# Patient Record
Sex: FEMALE | Race: WHITE | NOT HISPANIC OR LATINO | Employment: OTHER | URBAN - METROPOLITAN AREA
[De-identification: names, ages, dates, MRNs, and addresses within clinical notes are randomized per-mention and may not be internally consistent; named-entity substitution may affect disease eponyms.]

---

## 2017-03-10 ENCOUNTER — OFFICE VISIT (OUTPATIENT)
Dept: FAMILY MEDICINE | Facility: CLINIC | Age: 57
End: 2017-03-10
Payer: COMMERCIAL

## 2017-03-10 VITALS
HEART RATE: 73 BPM | HEIGHT: 62 IN | OXYGEN SATURATION: 100 % | WEIGHT: 130 LBS | SYSTOLIC BLOOD PRESSURE: 116 MMHG | RESPIRATION RATE: 14 BRPM | DIASTOLIC BLOOD PRESSURE: 73 MMHG | BODY MASS INDEX: 23.92 KG/M2 | TEMPERATURE: 98.6 F

## 2017-03-10 DIAGNOSIS — H10.32 ACUTE CONJUNCTIVITIS OF LEFT EYE, UNSPECIFIED ACUTE CONJUNCTIVITIS TYPE: Primary | ICD-10-CM

## 2017-03-10 PROCEDURE — 99213 OFFICE O/P EST LOW 20 MIN: CPT | Performed by: PHYSICIAN ASSISTANT

## 2017-03-10 RX ORDER — MULTIPLE VITAMINS W/ MINERALS TAB 9MG-400MCG
1 TAB ORAL DAILY
COMMUNITY
End: 2017-09-12

## 2017-03-10 RX ORDER — POLYMYXIN B SULFATE AND TRIMETHOPRIM 1; 10000 MG/ML; [USP'U]/ML
1 SOLUTION OPHTHALMIC EVERY 4 HOURS
Qty: 1 BOTTLE | Refills: 0 | Status: SHIPPED | OUTPATIENT
Start: 2017-03-10 | End: 2017-03-17

## 2017-03-10 NOTE — NURSING NOTE
"Chief Complaint   Patient presents with     Eye Problem       Initial /73  Pulse 73  Temp 98.6  F (37  C) (Oral)  Ht 5' 2\" (1.575 m)  Wt 130 lb (59 kg)  LMP 12/10/2010  SpO2 100%  Breastfeeding? No  BMI 23.78 kg/m2 Estimated body mass index is 23.78 kg/(m^2) as calculated from the following:    Height as of this encounter: 5' 2\" (1.575 m).    Weight as of this encounter: 130 lb (59 kg).  Medication Reconciliation: complete     Shalini Umanzor CMA      "

## 2017-03-10 NOTE — MR AVS SNAPSHOT
After Visit Summary   3/10/2017    Margarita See    MRN: [id number]           Patient Information     Date Of Birth          1960        Visit Information        Provider Department      3/10/2017 11:40 AM Jhonatan Cote PA [facility]        Today's Diagnoses     Acute conjunctivitis of left eye, unspecified acute conjunctivitis type    -  1      Care Instructions    How to contact your care team: [phone] Pharmacy [phone]   LENNOX FRIEDMAN MD KATYA GEORGIEV, PA-C CHRIS JONES, PA-C NAM HO, MD JONATHAN BATES, MD ARVIN VOCAL, MD    Clinic hours M-Th 7am-7pm Fri 7am-5pm.   Urgent care M-F 11am-9pm  Sat/Sun 9am-5pm.   Pharmacy   Mon-Th:  8:00am-8pm   Fri:  8:00am-6:00pm  Sat/Sun  8:00am-5:00 pm       Return to clinic or Emergency Department for fevers, vision loss, or worsening symptoms.    Conjunctivitis Caused by Infection  Infections are caused by viruses or bacteria. Treatment includes keeping your eyes and hands clean. Your doctor may prescribe eyedrops, and tell you to stay home from work or school if you re contagious. Untreated infections can be serious, so it s important that a doctor diagnoses you    Viral Infections  A cold, flu, or other virus can spread to the eyes. This causes a watery discharge. The eyes may burn or itch and get red. The eyelids may also be puffy and sore.  Treating the infections. Most viral infections go away on their own. Artificial tears, over the counter antihistamine eye drops, and warm compresses can relieve symptoms. Your doctor may also prescribe eyedrops. A viral infection can be very contagious and spreads quickly. To prevent this, wash your hands often. Use a separate tissue to wipe each eye. Don t touch your eyes or share bedding or towels.  Bacterial Infections  Bacterial infections often occur in one eye. There may be a watery or a thick discharge from the eye. These infections can  cause serious damage to the eye if not treated promptly.  Treating the infections. Your doctor may prescribe eyedrops or ointment to kill the bacteria. Warm compresses can help keep the eyelids clean. To keep the bacteria from spreading, wash your hands often. Use a separate tissue to wipe each eye. Don t touch your eyes or share bedding or towels.    6330-8760 [facility], [address] [place] [address], [place] [address]. All rights reserved. This information is not intended as a substitute for professional medical care. Always follow your healthcare professional's instructions.    \          Follow-ups after your visit        Follow-up notes from your care team     Return if symptoms worsen or fail to improve.      Who to contact     If you have questions or need follow up information about today's clinic visit or your schedule please contact [facility] directly at [phone].  Normal or non-critical lab and imaging results will be communicated to you by MyChart, letter or phone within 4 business days after the clinic has received the results. If you do not hear from us within 7 days, please contact the clinic through Fidus Writerhart or phone. If you have a critical or abnormal lab result, we will notify you by phone as soon as possible.  Submit refill requests through Storyful or call your pharmacy and they will forward the refill request to us. Please allow 3 business days for your refill to be completed.          Additional Information About Your Visit        MyChart Information     Storyful gives you secure access to your electronic health record. If you see a primary care provider, you can also send messages to your care team and make appointments. If you have questions, please call your primary care clinic.  If you do not have a primary care provider, please call [phone] and they will assist you.        Care EveryWhere ID     This is your Care EveryWhere ID. This could be used by other  "organizations to access your [place] medical records  [id number]        Your Vitals Were     Pulse Temperature Respirations Height Last Period Pulse Oximetry    73 98.6  F (37  C) (Oral) 14 5' 2\" (1.575 m) 12/10/2010 100%    Breastfeeding? BMI (Body Mass Index)                No 23.78 kg/m2           Blood Pressure from Last 3 Encounters:   03/10/17 116/73   07/22/16 114/72   06/23/16 110/67    Weight from Last 3 Encounters:   03/10/17 130 lb (59 kg)   07/14/16 124 lb (56.2 kg)   06/23/16 127 lb (57.6 kg)              Today, you had the following     No orders found for display         Today's Medication Changes          These changes are accurate as of: 3/10/17 11:47 AM.  If you have any questions, ask your nurse or doctor.               Start taking these medicines.        Dose/Directions    trimethoprim-polymyxin b ophthalmic solution   Commonly known as:  POLYTRIM   Used for:  Acute conjunctivitis of left eye, unspecified acute conjunctivitis type   Started by:  Jhonatan Cote PA        Dose:  1 drop   Apply 1 drop to eye every 4 hours for 7 days   Quantity:  1 Bottle   Refills:  0            Where to get your medicines      These medications were sent to [place] [address] [place] [facility] [id number] [place] [address]     Phone:  [phone]     trimethoprim-polymyxin b ophthalmic solution                Primary Care Provider Office Phone # Fax #    Luke Dalal [phone]       [facility] [address] [facility] [address]        Thank you!     Thank you for choosing [facility]  for your care. Our goal is always to provide you with excellent care. Hearing back from our patients is one way we can continue to improve our services. Please take a few minutes to complete the written survey that you may receive in the mail after your visit with us. Thank you!             Your Updated Medication List - " Protect others around you: Learn how to safely use, store and throw away your medicines at www.disposemymeds.org.          This list is accurate as of: 3/10/17 11:47 AM.  Always use your most recent med list.                   Brand Name Dispense Instructions for use    CRANBERRY PO          FLAXSEED OIL PO          Multi-vitamin Tabs tablet      Take 1 tablet by mouth daily       trimethoprim-polymyxin b ophthalmic solution    POLYTRIM    1 Bottle    Apply 1 drop to eye every 4 hours for 7 days

## 2017-03-10 NOTE — PATIENT INSTRUCTIONS
How to contact your care team: [phone] Pharmacy [phone]   LENNOX FRIEDMAN MD KATYA GEORGIEV, PA-C CHRIS JONES, PA-C NAM HO, MD JONATHAN BATES, MD ARVIN VOCAL, MD    Clinic hours M-Th 7am-7pm Fri 7am-5pm.   Urgent care M-F 11am-9pm  Sat/Sun 9am-5pm.   Pharmacy   Mon-Th:  8:00am-8pm   Fri:  8:00am-6:00pm  Sat/Sun  8:00am-5:00 pm       Return to clinic or Emergency Department for fevers, vision loss, or worsening symptoms.    Conjunctivitis Caused by Infection  Infections are caused by viruses or bacteria. Treatment includes keeping your eyes and hands clean. Your doctor may prescribe eyedrops, and tell you to stay home from work or school if you re contagious. Untreated infections can be serious, so it s important that a doctor diagnoses you    Viral Infections  A cold, flu, or other virus can spread to the eyes. This causes a watery discharge. The eyes may burn or itch and get red. The eyelids may also be puffy and sore.  Treating the infections. Most viral infections go away on their own. Artificial tears, over the counter antihistamine eye drops, and warm compresses can relieve symptoms. Your doctor may also prescribe eyedrops. A viral infection can be very contagious and spreads quickly. To prevent this, wash your hands often. Use a separate tissue to wipe each eye. Don t touch your eyes or share bedding or towels.  Bacterial Infections  Bacterial infections often occur in one eye. There may be a watery or a thick discharge from the eye. These infections can cause serious damage to the eye if not treated promptly.  Treating the infections. Your doctor may prescribe eyedrops or ointment to kill the bacteria. Warm compresses can help keep the eyelids clean. To keep the bacteria from spreading, wash your hands often. Use a separate tissue to wipe each eye. Don t touch your eyes or share bedding or towels.    5705-9789 [address], [place] [address].  All rights reserved. This information is not intended as a substitute for professional medical care. Always follow your healthcare professional's instructions.    \

## 2017-03-10 NOTE — PROGRESS NOTES
"  SUBJECTIVE:                                                    Margarita See is a 57 year old female who presents to clinic today for the following health issues:      Eye(s) Problem     Onset: 2 days ago    Description:   Location: left  Pain: no  Redness: YES    Accompanying Signs & Symptoms:  Discharge/mattering: YES this am  Swelling: YES  Visual changes: no  Fever: no  Nasal Congestion: no  Bothered by bright lights: no     History:   Trauma: YES- poked eye with mascara brush 2 days ago  Foreign body exposure: no    Precipitating factors:   Wearing contacts: no    Alleviating factors:  Improved by: nothing        Therapies Tried and outcome: nothing     wears glasses but not contacts, eye itchy and irritated but not painful.           Allergies   Allergen Reactions     Spinach Nausea and Vomiting       Past Medical History   Diagnosis Date     Allergic rhinitis 11/23/2010     History of asthma      Menorrhagia          Current Outpatient Prescriptions on File Prior to Visit:  Flaxseed, Linseed, (FLAXSEED OIL PO)    CRANBERRY PO      No current facility-administered medications on file prior to visit.     Social History   Substance Use Topics     Smoking status: Never Smoker     Smokeless tobacco: Never Used     Alcohol use Yes      Comment: occasional       ROS:  General: negative for fever  EYE: as above  ENT: as above- no cough or coryza    OBJECTIVE:  /73  Pulse 73  Temp 98.6  F (37  C) (Oral)  Resp 14  Ht 5' 2\" (1.575 m)  Wt 130 lb (59 kg)  LMP 12/10/2010  SpO2 100%  Breastfeeding? No  BMI 23.78 kg/m2   General:   awake, alert, and cooperative.  NAD.   Head: Normocephalic, atraumatic.  Eyes:LEFT: Conjunctiva mildly injected laterally on left,no exudates EOMI, PERRLA, no FB, no abrasions  Lungs: No respiratory distress  Neuro: Alert and oriented - normal speech.    ASSESSMENT:    ICD-10-CM    1. Acute conjunctivitis of left eye, unspecified acute conjunctivitis type H10.32 trimethoprim-polymyxin b " (POLYTRIM) ophthalmic solution         PLAN:   Advised about symptoms which might herald more serious problems.

## 2017-03-24 ENCOUNTER — TELEPHONE (OUTPATIENT)
Dept: FAMILY MEDICINE | Facility: CLINIC | Age: 57
End: 2017-03-24

## 2017-03-24 NOTE — TELEPHONE ENCOUNTER
..Reason for call:  Patient reporting a symptom    Symptom or request: severe constipation;    Duration (how long have symptoms been present): one week    Have you been treated for this before? Yes    Additional comments: took gas-x for one week, then beano with meals, alleviated pain; stopped it  Then SX restarted today   /// had colonoscopy less than one year ago  [facility], [place]  Phone Number patient can be reached at:  Home number on file [phone] (home)    Best Time:  anytime    Can we leave a detailed message on this number:  yes    Call taken on 3/24/2017 at 1:51 PM by Vernell Elizabeth

## 2017-03-24 NOTE — TELEPHONE ENCOUNTER
Margarita See is a 57 year old female     NURSING ASSESSMENT:  Description:  Feeling constipated x 1 week. Had gas x and then tried prunes which helped somewhat. Last BM was yesterday. Now today having hard stools again with pain when trying to go.    RECOMMENDED DISPOSITION:  Home care advice - Try an OTC laxative - such as senna, stool softener, or milk of magnesia. Drink plenty of water. Can follow up in Urgent care as needed. Hours of operation given.  Will comply with recommendation: Yes    Advised If further questions/concerns or if symptoms do not improve, worsen or new symptoms develop, call back clinic as soon as possible with 24 hr nurse line available by calling clinic.    Guideline used:  Telephone Triage Protocols for Nurses, Fifth Edition, Afshan Zheng, Clinic MAYE Souza

## 2017-08-16 ENCOUNTER — TRANSFERRED RECORDS (OUTPATIENT)
Dept: HEALTH INFORMATION MANAGEMENT | Facility: CLINIC | Age: 57
End: 2017-08-16

## 2017-08-16 LAB — PAP-ABSTRACT: NORMAL

## 2017-09-04 ENCOUNTER — OFFICE VISIT (OUTPATIENT)
Dept: URGENT CARE | Facility: URGENT CARE | Age: 57
End: 2017-09-04
Payer: COMMERCIAL

## 2017-09-04 VITALS
BODY MASS INDEX: 23.89 KG/M2 | OXYGEN SATURATION: 100 % | DIASTOLIC BLOOD PRESSURE: 78 MMHG | SYSTOLIC BLOOD PRESSURE: 124 MMHG | HEART RATE: 88 BPM | TEMPERATURE: 98.3 F | WEIGHT: 130.6 LBS

## 2017-09-04 DIAGNOSIS — K57.32 DIVERTICULITIS OF COLON: Primary | ICD-10-CM

## 2017-09-04 PROCEDURE — 99213 OFFICE O/P EST LOW 20 MIN: CPT | Performed by: PHYSICIAN ASSISTANT

## 2017-09-04 RX ORDER — METRONIDAZOLE 500 MG/1
500 TABLET ORAL 3 TIMES DAILY
Qty: 21 TABLET | Refills: 0 | Status: SHIPPED | OUTPATIENT
Start: 2017-09-04 | End: 2017-09-12

## 2017-09-04 RX ORDER — SULFAMETHOXAZOLE/TRIMETHOPRIM 800-160 MG
1 TABLET ORAL 2 TIMES DAILY
Qty: 14 TABLET | Refills: 0 | Status: SHIPPED | OUTPATIENT
Start: 2017-09-04 | End: 2017-09-11

## 2017-09-04 NOTE — PROGRESS NOTES
SUBJECTIVE:   Margarita See is a 57 year old female who presents to clinic today for the following health issues:      Abdominal Pain      Duration: 6 days    Description (location/character/radiation): abdominal pain       Associated flank pain: none    Intensity:  moderate    Accompanying signs and symptoms:        Fever/Chills: YES       Gas/Bloating: YES       Nausea/vomitting: no        Diarrhea: no        Dysuria or Hematuria: no     History (previous similar pain/trauma/previous testing): none    Precipitating or alleviating factors:       Pain worse with eating/BM/urination: yes       Pain relieved by BM: no     Therapies tried and outcome: senokot, flax seed oil    LMP:  not applicable    She had a fever on Thursday (4 days ago, but not since)  Fever was 100.2  She went to the [facility] fair Wednesday night, ate junk food.  She ate donuts, hamburger, fries, beer, bar - after that started feeling worse.  Normally she eats very healthy.  No nausea, vomiting, and diarrhea  She has had stringy bowel movements.    Mostly bloated.  She can poop, but stools are not hard.   She has hemorrhoids, and is careful about straining.  Pain with trying to have bowel movement  The first few days, pain improved with passing gas    No vaginal symptoms or urinary symptoms.    Colonoscopy last year revealed diverticulosis, and she has been treated for presumed diverticulitis once before.       Problem list and histories reviewed & adjusted, as indicated.  Additional history: as documented    Patient Active Problem List   Diagnosis     CARDIOVASCULAR SCREENING; LDL GOAL LESS THAN 160     Allergic rhinitis     Generalized anxiety disorder     Menometrorrhagia     Recurrent sinusitis     Past Surgical History:   Procedure Laterality Date     COLONOSCOPY WITH CO2 INSUFFLATION N/A 7/22/2016    Procedure: COLONOSCOPY WITH CO2 INSUFFLATION;  Surgeon: Duane, William Charles, MD;  Location:  OR     No surgical history         Social  History   Substance Use Topics     Smoking status: Never Smoker     Smokeless tobacco: Never Used     Alcohol use Yes      Comment: occasional     Family History   Problem Relation Age of Onset     DIABETES Mother      DIABETES Maternal Grandmother      Breast Cancer Paternal Aunt          Current Outpatient Prescriptions   Medication Sig Dispense Refill     sulfamethoxazole-trimethoprim (BACTRIM DS/SEPTRA DS) 800-160 MG per tablet Take 1 tablet by mouth 2 times daily for 7 days 14 tablet 0     metroNIDAZOLE (FLAGYL) 500 MG tablet Take 1 tablet (500 mg) by mouth 3 times daily 21 tablet 0     Flaxseed, Linseed, (FLAXSEED OIL PO)        multivitamin, therapeutic with minerals (MULTI-VITAMIN) TABS tablet Take 1 tablet by mouth daily       CRANBERRY PO        Allergies   Allergen Reactions     Spinach Nausea and Vomiting         Reviewed and updated as needed this visit by clinical staff     Reviewed and updated as needed this visit by Provider       ROS:  As in HPI      OBJECTIVE:     /78 (BP Location: Left arm, Patient Position: Chair, Cuff Size: Adult Regular)  Pulse 88  Temp 98.3  F (36.8  C) (Oral)  Wt 130 lb 9.6 oz (59.2 kg)  LMP 12/10/2010  SpO2 100%  BMI 23.89 kg/m2  Body mass index is 23.89 kg/(m^2).  GENERAL: healthy, alert and no distress  RESP: lungs clear to auscultation - no rales, rhonchi or wheezes  CV: regular rate and rhythm, normal S1 S2, no murmur  ABDOMEN: soft, tenderness LLQ and umbilical and no organomegaly or masses  MS: no gross musculoskeletal defects noted, no edema  SKIN: no suspicious lesions or rashes  NEURO: Normal strength and tone, mentation intact and speech normal    Diagnostic Test Results:  none     ASSESSMENT/PLAN:     1. Diverticulitis of colon  - sulfamethoxazole-trimethoprim (BACTRIM DS/SEPTRA DS) 800-160 MG per tablet; Take 1 tablet by mouth 2 times daily for 7 days  Dispense: 14 tablet; Refill: 0  - metroNIDAZOLE (FLAGYL) 500 MG tablet; Take 1 tablet (500 mg) by  mouth 3 times daily  Dispense: 21 tablet; Refill: 0  She is completely stable today, so I feel comfortable treating outpatient, and she will follow up immediately if she is getting worse or develops a fever.   Follow up in one week with Dr. Dalal.     Shalini Dickens PA-C  [facility]

## 2017-09-04 NOTE — NURSING NOTE
"Chief Complaint   Patient presents with     Abdominal Pain       Initial /78 (BP Location: Left arm, Patient Position: Chair, Cuff Size: Adult Regular)  Pulse 88  Temp 98.3  F (36.8  C) (Oral)  Wt 130 lb 9.6 oz (59.2 kg)  LMP 12/10/2010  SpO2 100%  BMI 23.89 kg/m2 Estimated body mass index is 23.89 kg/(m^2) as calculated from the following:    Height as of 3/10/17: 5' 2\" (1.575 m).    Weight as of this encounter: 130 lb 9.6 oz (59.2 kg).  Medication Reconciliation: complete       Hailee Ledezma    "

## 2017-09-04 NOTE — MR AVS SNAPSHOT
After Visit Summary   9/4/2017    Margarita See    MRN: [id number]           Patient Information     Date Of Birth          1960        Visit Information        Provider Department      9/4/2017 11:05 AM Shalini Dickens PA-C [facility]        Today's Diagnoses     Diverticulitis of colon    -  1      Care Instructions    Follow up in one week with Dr. Dalal.           Follow-ups after your visit        Who to contact     If you have questions or need follow up information about today's clinic visit or your schedule please contact [facility] directly at [phone].  Normal or non-critical lab and imaging results will be communicated to you by Entigohart, letter or phone within 4 business days after the clinic has received the results. If you do not hear from us within 7 days, please contact the clinic through Entigohart or phone. If you have a critical or abnormal lab result, we will notify you by phone as soon as possible.  Submit refill requests through Stylenda or call your pharmacy and they will forward the refill request to us. Please allow 3 business days for your refill to be completed.          Additional Information About Your Visit        MyChart Information     Stylenda gives you secure access to your electronic health record. If you see a primary care provider, you can also send messages to your care team and make appointments. If you have questions, please call your primary care clinic.  If you do not have a primary care provider, please call [phone] and they will assist you.        Care EveryWhere ID     This is your Care EveryWhere ID. This could be used by other organizations to access your [place] medical records  [id number]        Your Vitals Were     Pulse Temperature Last Period Pulse Oximetry BMI (Body Mass Index)       88 98.3  F (36.8  C) (Oral) 12/10/2010 100% 23.89 kg/m2        Blood Pressure from Last 3 Encounters:    09/04/17 124/78   03/10/17 116/73   07/22/16 114/72    Weight from Last 3 Encounters:   09/04/17 130 lb 9.6 oz (59.2 kg)   03/10/17 130 lb (59 kg)   07/14/16 124 lb (56.2 kg)              Today, you had the following     No orders found for display         Today's Medication Changes          These changes are accurate as of: 9/4/17 11:49 AM.  If you have any questions, ask your nurse or doctor.               Start taking these medicines.        Dose/Directions    metroNIDAZOLE 500 MG tablet   Commonly known as:  FLAGYL   Used for:  Diverticulitis of colon   Started by:  Shalini Dickens PA-C        Dose:  500 mg   Take 1 tablet (500 mg) by mouth 3 times daily   Quantity:  21 tablet   Refills:  0       sulfamethoxazole-trimethoprim 800-160 MG per tablet   Commonly known as:  BACTRIM DS/SEPTRA DS   Used for:  Diverticulitis of colon   Started by:  Shalini Dickens PA-C        Dose:  1 tablet   Take 1 tablet by mouth 2 times daily for 7 days   Quantity:  14 tablet   Refills:  0            Where to get your medicines      These medications were sent to [place] [address] [place] [facility] [address] [place] [address]  [address] [facility] [address]     Phone:  [phone]     metroNIDAZOLE 500 MG tablet    sulfamethoxazole-trimethoprim 800-160 MG per tablet                Primary Care Provider Office Phone # Fax #    Luke Dalal [phone]       [address]  [facility] [address]        Equal Access to Services     [facility]: Hadii aad ku hadasho Soomaali, waaxda luqadaha, qaybta kaalmada adeegyada, waxay obiin hayisha kemp. So [facility] [phone].    ATENCIÓN: Si habla español, tiene a hopson disposición servicios gratuitos de asistencia lingüística. Llame al [phone].    We comply with applicable federal civil rights laws and [place] laws. We do not discriminate on the basis of race, color, national origin, age, disability sex, sexual  orientation or gender identity.            Thank you!     Thank you for choosing [facility]  for your care. Our goal is always to provide you with excellent care. Hearing back from our patients is one way we can continue to improve our services. Please take a few minutes to complete the written survey that you may receive in the mail after your visit with us. Thank you!             Your Updated Medication List - Protect others around you: Learn how to safely use, store and throw away your medicines at www.disposemymeds.org.          This list is accurate as of: 9/4/17 11:49 AM.  Always use your most recent med list.                   Brand Name Dispense Instructions for use Diagnosis    CRANBERRY PO           FLAXSEED OIL PO           metroNIDAZOLE 500 MG tablet    FLAGYL    21 tablet    Take 1 tablet (500 mg) by mouth 3 times daily    Diverticulitis of colon       Multi-vitamin Tabs tablet      Take 1 tablet by mouth daily        sulfamethoxazole-trimethoprim 800-160 MG per tablet    BACTRIM DS/SEPTRA DS    14 tablet    Take 1 tablet by mouth 2 times daily for 7 days    Diverticulitis of colon

## 2017-09-06 ENCOUNTER — TELEPHONE (OUTPATIENT)
Dept: FAMILY MEDICINE | Facility: CLINIC | Age: 57
End: 2017-09-06

## 2017-09-06 DIAGNOSIS — K57.32 DIVERTICULITIS OF COLON: Primary | ICD-10-CM

## 2017-09-06 NOTE — TELEPHONE ENCOUNTER
Reason for call:  Symptom   Symptom or request: reaction to new medication     Duration (how long have symptoms been present): 2 nights  Have you been treated for this before? No    Additional comments: feeling stabbing pain in her joints   Call to advise      Phone number to reach patient:  [phone]    Best Time:  any    Can we leave a detailed message on this number?  YES have her paged if she doesn't answer

## 2017-09-06 NOTE — TELEPHONE ENCOUNTER
I spoke with patient- abd doing better but still having bloating.  Episodes for joint pain were severe but very brief, and only while sleeping, has been well throughout the day today. Denies other adverse events.  We did discuss different options and she decided to continue the current regimen tonight and if symptoms recur will switch to augmentin instead. This rx was sent to pharmacy  Eliseo Cote PA-C

## 2017-09-06 NOTE — TELEPHONE ENCOUNTER
"Patient was seen in UC 9/4/17 with Dx of diverticulitis - Bactrim DS and Flagyl given.    Returned call to patient.  Pt states she started abx above on 9/4 (mon). On mon and Tuesday night, she awoke suddenly out of sleep feeling \"shocks or zapping\" in her knees and elbows, \"like a tack sticking in there\".  This subsided quickly with no residual soreness.  Pt is concerned this could be related to side effects from medications.  No history of same sx.    Currently she states she is feeling a little better with sx she was seen for.  Still having a little abd cramping but was able to eat and had a formed small BM's yesterday and today a formed normal BM.  No fevers, N/V.    Side effects for Bactrim show very rare for joint pain or paresthesia. Flagyl shows rare of peripheral neuropathy, common (1-10%) myalgias    Routing message to  provider to review and advise.      Mac Zheng RN        "

## 2017-09-06 NOTE — TELEPHONE ENCOUNTER
"..Reason for Call:  Patient calling to provide a \"better\" number to be called at    Detailed comments: none    Phone Number Patient can be reached at: Cell number on file:    Telephone Information:   Mobile [phone]       Best Time: anytime    Can we leave a detailed message on this number? YES    Call taken on 9/6/2017 at 4:15 PM by Vernell Elizabeth      "

## 2017-09-09 ENCOUNTER — NURSE TRIAGE (OUTPATIENT)
Dept: NURSING | Facility: CLINIC | Age: 57
End: 2017-09-09

## 2017-09-09 NOTE — TELEPHONE ENCOUNTER
Margarita is taking metronidazole and sulfamethoxazole for diverticulitis.  Last night Margarita had numbness in both arms and the day before left arm.    [facility] [place] [phone].  FNA paged on call MD Amy Sales at 9:36 am.  MD advised to stop medication  And to follow up with primary MD on Monday September 11th and FNA relayed message.

## 2017-09-12 ENCOUNTER — OFFICE VISIT (OUTPATIENT)
Dept: FAMILY MEDICINE | Facility: CLINIC | Age: 57
End: 2017-09-12
Payer: COMMERCIAL

## 2017-09-12 VITALS
TEMPERATURE: 98.1 F | BODY MASS INDEX: 23.92 KG/M2 | OXYGEN SATURATION: 99 % | SYSTOLIC BLOOD PRESSURE: 105 MMHG | WEIGHT: 130 LBS | HEIGHT: 62 IN | DIASTOLIC BLOOD PRESSURE: 70 MMHG | HEART RATE: 81 BPM

## 2017-09-12 DIAGNOSIS — R10.30 ABDOMINAL PAIN, LOWER: Primary | ICD-10-CM

## 2017-09-12 PROCEDURE — 99213 OFFICE O/P EST LOW 20 MIN: CPT | Performed by: FAMILY MEDICINE

## 2017-09-12 NOTE — PROGRESS NOTES
"  SUBJECTIVE:   Margarita See is a 57 year old female who presents to clinic today for the following health issues:    ED/UC Followup:    Facility:   urgent care  Date of visit: 9-4-17  Reason for visit: diverticulitis of colon  Current Status: better       Problem list and histories reviewed & adjusted, as indicated.  Additional history: as documented    Patient Active Problem List   Diagnosis     CARDIOVASCULAR SCREENING; LDL GOAL LESS THAN 160     Allergic rhinitis     Generalized anxiety disorder     Menometrorrhagia     Recurrent sinusitis     Past Surgical History:   Procedure Laterality Date     COLONOSCOPY WITH CO2 INSUFFLATION N/A 7/22/2016    Procedure: COLONOSCOPY WITH CO2 INSUFFLATION;  Surgeon: Duane, William Charles, MD;  Location: [facility] OR     No surgical history         Social History   Substance Use Topics     Smoking status: Never Smoker     Smokeless tobacco: Never Used     Alcohol use Yes      Comment: occasional     Family History   Problem Relation Age of Onset     DIABETES Mother      DIABETES Maternal Grandmother      Breast Cancer Paternal Aunt              Reviewed and updated as needed this visit by clinical staffTobacco       Reviewed and updated as needed this visit by Provider         ROS:  Constitutional, HEENT, cardiovascular, pulmonary, GI, , musculoskeletal, neuro, skin, endocrine and psych systems are negative, except as otherwise noted.      OBJECTIVE:   /70 (BP Location: Left arm, Patient Position: Chair, Cuff Size: Adult Regular)  Pulse 81  Temp 98.1  F (36.7  C) (Oral)  Ht 5' 2\" (1.575 m)  Wt 130 lb (59 kg)  LMP 12/10/2010  SpO2 99%  BMI 23.78 kg/m2  Body mass index is 23.78 kg/(m^2).  GENERAL: healthy, alert and no distress  NECK: no adenopathy, no asymmetry, masses, or scars and thyroid normal to palpation  RESP: lungs clear to auscultation - no rales, rhonchi or wheezes  CV: regular rate and rhythm, normal S1 S2, no S3 or S4, no murmur, click or rub, no " peripheral edema and peripheral pulses strong  ABDOMEN: soft, nontender, no hepatosplenomegaly, no masses and bowel sounds normal  MS: no gross musculoskeletal defects noted, no edema    Diagnostic Test Results:  none     ASSESSMENT/PLAN:     1. Abdominal pain, lower  Patient treated for possible diverticulitis with bactrim and metronidazole. Has resolved. RTC as needed.      See Patient Instructions    Luke Dalal MD, MD  [facility]

## 2017-09-12 NOTE — PATIENT INSTRUCTIONS
This summary includes the important diagnoses, test, medications and other important parts of your medical history.  Below are a few good we sites you can use to learn more about these.     Www.[facility].org : Up to date and easily searchable information on multiple topics.  Www.ASCENDANT MDX.org/Pharmacy/c_[email or web address].asp : [facility] $4.99 medications  Www.medlineplus.gov : medication info, interactive tutorials, watch real surgeries online  Www.familydoctor.org : good info from the Academy of Family Physicians  Www.[employer] Yardinic.Publer : good info from the [facility]  Www.cdc.gov : public health info, travel advisories, epidemics (H1N1)  Www.aap.org : children's health info, normal development, vaccinations  Www.health.[facility].[place].us : [place] dept of heat, public health issues in [place], N1N1    Based on your medical history and these are the current health maintenance or preventive care services that you are due for (some may have been done at this visit:)  Health Maintenance Due   Topic Date Due     TETANUS IMMUNIZATION (SYSTEM ASSIGNED)  01/07/1979     HEPATITIS C SCREENING  01/07/1979     ADVANCE DIRECTIVE PLANNING Q5 YRS  01/07/2016     LIPID MONITORING Q5 YEARS  03/09/2017     FIT Q1 YR  06/28/2017     INFLUENZA VACCINE (SYSTEM ASSIGNED)  09/01/2017     =================================================================================  Normal Values   Blood pressure  <140/90 for most adults    <130/80 for some chronic diseases (ask your care team about yours)    BMI (body mass index)  18.5-25 kg/m2 (based on height and weight)     Thank you for visiting [facility]    Normal or non-critical lab and imaging results will be communicated to you by MyChart, letter or phone within 7 days.  If you do not hear from us within 10 days, please call the clinic. If you have a critical or abnormal lab result, we will notify you by phone as soon as possible.     If you have any questions regarding your visit please  contact:     Team Comfort:   Clinic Hours Telephone Number   Dr. Kiran Marques   7am-5pm  Monday - Friday [phone]  Mac RN   Pharmacy 8am-8pm Monday-Thursday      8am-6pm Friday  9am-5pm Saturday-Sunday [phone]   Urgent Care 11am-8pm Monday-Friday        9am-5pm Saturday-Sunday [phone]     After hours, weekend or if you need to make an appointment with your primary provider please call [phone].   After Hours nurse advise: call [facility]: [phone]    Medication Refills:  Call your pharmacy and they will forward the refill to us. Please allow 3 business days for your refills to be completed.    Use [place] (secure email communication and access to your chart) to send your primary care provider a message or make an appointment. Ask someone on your Team how to sign up for [place]. To log on to [employer] or for more information in ralali please visit the website at [email or web address].  As of October 8, 2013, all password changes, disabled accounts, or ID changes in [place]/MyHealth will be done by our Access Services Department.   If you need help with your account or password, call: [phone]. Clinic staff no longer has the ability to change passwords.

## 2017-09-12 NOTE — NURSING NOTE
"Chief Complaint   Patient presents with     other     diverticulitis of colon follow up from urgent care       Initial /70 (BP Location: Left arm, Patient Position: Chair, Cuff Size: Adult Regular)  Pulse 81  Temp 98.1  F (36.7  C) (Oral)  Ht 5' 2\" (1.575 m)  Wt 130 lb (59 kg)  LMP 12/10/2010  SpO2 99%  BMI 23.78 kg/m2 Estimated body mass index is 23.78 kg/(m^2) as calculated from the following:    Height as of this encounter: 5' 2\" (1.575 m).    Weight as of this encounter: 130 lb (59 kg).  Medication Reconciliation: complete     Darin Nina MA      "

## 2017-09-12 NOTE — MR AVS SNAPSHOT
After Visit Summary   9/12/2017    Margarita See    MRN: [id number]           Patient Information     Date Of Birth          1960        Visit Information        Provider Department      9/12/2017 3:40 PM Luke Dalal MD [facility]        Today's Diagnoses     Abdominal pain, lower    -  1      Care Instructions    This summary includes the important diagnoses, test, medications and other important parts of your medical history.  Below are a few good we sites you can use to learn more about these.     Www.[employer].org : Up to date and easily searchable information on multiple topics.  Www.[facility].VideoPros/Pharmacy/c_539084.asp : [facility] $4.99 medications  Www.Worksoft.gov : medication info, interactive tutorials, watch real surgeries online  Www.familydoctor.org : good info from the Academy of Family Physicians  Www.Last Guide.Rentobo : good info from the [facility]  Www.cdc.gov : public health info, travel advisories, epidemics (H1N1)  Www.aap.org : children's health info, normal development, vaccinations  Www.health.[facility].[place].us : [place] dept of heatlh, public health issues in [place], N1N1    Based on your medical history and these are the current health maintenance or preventive care services that you are due for (some may have been done at this visit:)  Health Maintenance Due   Topic Date Due     TETANUS IMMUNIZATION (SYSTEM ASSIGNED)  01/07/1979     HEPATITIS C SCREENING  01/07/1979     ADVANCE DIRECTIVE PLANNING Q5 YRS  01/07/2016     LIPID MONITORING Q5 YEARS  03/09/2017     FIT Q1 YR  06/28/2017     INFLUENZA VACCINE (SYSTEM ASSIGNED)  09/01/2017     =================================================================================  Normal Values   Blood pressure  <140/90 for most adults    <130/80 for some chronic diseases (ask your care team about yours)    BMI (body mass index)  18.5-25 kg/m2 (based on height and weight)     Thank you for visiting [facility] [place]    Normal or non-critical lab and imaging results will be communicated to you by MyChart, letter or phone within 7 days.  If you do not hear from us within 10 days, please call the clinic. If you have a critical or abnormal lab result, we will notify you by phone as soon as possible.     If you have any questions regarding your visit please contact:     Team Comfort:   Clinic Hours Telephone Number   Dr. Kiran Marques   7am-5pm  Monday - Friday [phone]  Mac VILLAVICENCIO   Pharmacy 8am-8pm Monday-Thursday      8am-6pm Friday  9am-5pm Saturday-Sunday [phone]   Urgent Care 11am-8pm Monday-Friday        9am-5pm Saturday-Sunday [phone]     After hours, weekend or if you need to make an appointment with your primary provider please call [phone].   After Hours nurse advise: call [place] [facility]: [phone]    Medication Refills:  Call your pharmacy and they will forward the refill to us. Please allow 3 business days for your refills to be completed.    Use [employer] (secure email communication and access to your chart) to send your primary care provider a message or make an appointment. Ask someone on your Team how to sign up for [employer]. To log on to [email or web address] or for more information in [employer] please visit the website at [email or web address].  As of October 8, 2013, all password changes, disabled accounts, or ID changes in [employer]/MyHealth will be done by our Access Services Department.   If you need help with your account or password, call: [phone]. Clinic staff no longer has the ability to change passwords.             Follow-ups after your visit        Who to contact     If you have questions or need follow up information about today's clinic visit or your schedule please contact [facility] SELENA LACEY directly at [phone].  Normal or non-critical lab and imaging results will be  "communicated to you by Planet[employer]anhart, letter or phone within 4 business days after the clinic has received the results. If you do not hear from us within 7 days, please contact the clinic through [employer] or phone. If you have a critical or abnormal lab result, we will notify you by phone as soon as possible.  Submit refill requests through [employer] or call your pharmacy and they will forward the refill request to us. Please allow 3 business days for your refill to be completed.          Additional Information About Your Visit        [employer] Information     [employer] gives you secure access to your electronic health record. If you see a primary care provider, you can also send messages to your care team and make appointments. If you have questions, please call your primary care clinic.  If you do not have a primary care provider, please call [phone] and they will assist you.        Care EveryWhere ID     This is your Care EveryWhere ID. This could be used by other organizations to access your [place] medical records  [id number]        Your Vitals Were     Pulse Temperature Height Last Period Pulse Oximetry BMI (Body Mass Index)    81 98.1  F (36.7  C) (Oral) 5' 2\" (1.575 m) 12/10/2010 99% 23.78 kg/m2       Blood Pressure from Last 3 Encounters:   09/12/17 105/70   09/04/17 124/78   03/10/17 116/73    Weight from Last 3 Encounters:   09/12/17 130 lb (59 kg)   09/04/17 130 lb 9.6 oz (59.2 kg)   03/10/17 130 lb (59 kg)              Today, you had the following     No orders found for display       Primary Care Provider Office Phone # Fax #    Luke Dalal [phone]       [address]  [facility] [address]        Equal Access to Services     [facility] : Carmina Hicks, leida mccallum, mono guaman, fatimah kemp. So [facility] [phone].    ATENCIÓN: Si habla español, tiene a hopson disposición servicios gratuitos de asistencia lingüística. Llame " al [phone].    We comply with applicable federal civil rights laws and [place] laws. We do not discriminate on the basis of race, color, national origin, age, disability sex, sexual orientation or gender identity.            Thank you!     Thank you for choosing [facility]  for your care. Our goal is always to provide you with excellent care. Hearing back from our patients is one way we can continue to improve our services. Please take a few minutes to complete the written survey that you may receive in the mail after your visit with us. Thank you!             Your Updated Medication List - Protect others around you: Learn how to safely use, store and throw away your medicines at www.disposemymeds.org.          This list is accurate as of: 9/12/17  4:26 PM.  Always use your most recent med list.                   Brand Name Dispense Instructions for use Diagnosis    CRANBERRY PO           FLAXSEED OIL PO           PROBIOTIC PO

## 2017-11-15 ENCOUNTER — TRANSFERRED RECORDS (OUTPATIENT)
Dept: HEALTH INFORMATION MANAGEMENT | Facility: CLINIC | Age: 57
End: 2017-11-15

## 2018-01-31 ENCOUNTER — OFFICE VISIT (OUTPATIENT)
Dept: FAMILY MEDICINE | Facility: CLINIC | Age: 58
End: 2018-01-31
Payer: COMMERCIAL

## 2018-01-31 VITALS
SYSTOLIC BLOOD PRESSURE: 112 MMHG | WEIGHT: 135.1 LBS | DIASTOLIC BLOOD PRESSURE: 73 MMHG | OXYGEN SATURATION: 100 % | BODY MASS INDEX: 24.71 KG/M2 | HEART RATE: 83 BPM | TEMPERATURE: 97.7 F

## 2018-01-31 DIAGNOSIS — J32.0 RIGHT MAXILLARY SINUSITIS: Primary | ICD-10-CM

## 2018-01-31 PROCEDURE — 99213 OFFICE O/P EST LOW 20 MIN: CPT | Performed by: NURSE PRACTITIONER

## 2018-01-31 RX ORDER — VENLAFAXINE HYDROCHLORIDE 37.5 MG/1
CAPSULE, EXTENDED RELEASE ORAL
Refills: 3 | COMMUNITY
Start: 2017-12-14 | End: 2018-02-09

## 2018-01-31 NOTE — PROGRESS NOTES
SUBJECTIVE:   Margarita See is a 58 year old female who presents to clinic today for the following health issues:  ENT Symptoms             Symptoms: cc Present Absent Comment   Fever/Chills   X    Fatigue  X     Muscle Aches   X    Eye Irritation  X  Right eye feels sensitive, red   Sneezing   X    Nasal Hi/Drg  X  Discharge this week   Sinus Pressure/Pain  X     Loss of smell   X    Dental pain   X    Sore Throat   X Had the tickle last week   Swollen Glands   X    Ear Pain/Fullness   X    Cough   X    Wheeze   X    Chest Pain  X  Starting tightening in chest   Shortness of breath   X    Rash   X    Other  X  Dizziness felt worse today, Occipital area sensitive.     Symptom duration: 10 days    Symptom severity:  moderate to severe   Treatments tried:  Mucinex last week, Mucinex Dm started recently   Contacts:  Works at [employer] in [place], co-worker had flu     58 year old female presents with concern for sinus infection x2 weeks. Had dry throat and tickle to start with sinus congestion. Now with runny nose and chest congestion. SInuses feel plugged. Right eye irrirtated. Right maxillary sinus tenderness. Not much comes out when she blows it. Had some dizziness this morning. Hasn't been drinking much water today. Using NetiPot. Was taking allergy pills, day/night Mucinex, Mucinex DM.    Weaning off of Effexor XR - should be done by the end of next week. Feeling well.    Problem list and histories reviewed & adjusted, as indicated.  Additional history: as documented    Patient Active Problem List   Diagnosis     CARDIOVASCULAR SCREENING; LDL GOAL LESS THAN 160     Allergic rhinitis     Generalized anxiety disorder     Menometrorrhagia     Recurrent sinusitis     Past Surgical History:   Procedure Laterality Date     COLONOSCOPY WITH CO2 INSUFFLATION N/A 7/22/2016    Procedure: COLONOSCOPY WITH CO2 INSUFFLATION;  Surgeon: Duane, William Charles, MD;  Location:  OR     No surgical history         Social History    Substance Use Topics     Smoking status: Never Smoker     Smokeless tobacco: Never Used     Alcohol use Yes      Comment: occasional     Family History   Problem Relation Age of Onset     DIABETES Mother      DIABETES Maternal Grandmother      Breast Cancer Paternal Aunt          Current Outpatient Prescriptions   Medication Sig Dispense Refill     venlafaxine (EFFEXOR-XR) 37.5 MG 24 hr capsule TAKE 1 CAPSULE BY MOUTH ONCE DAILY  3     amoxicillin-clavulanate (AUGMENTIN) 875-125 MG per tablet Take 1 tablet by mouth 2 times daily 20 tablet 0     Probiotic Product (PROBIOTIC PO)        Flaxseed, Linseed, (FLAXSEED OIL PO)        CRANBERRY PO        Allergies   Allergen Reactions     Spinach Nausea and Vomiting       Reviewed and updated as needed this visit by clinical staff  Tobacco  Allergies  Meds  Problems  Med Hx  Surg Hx  Fam Hx  Soc Hx        Reviewed and updated as needed this visit by Provider  Allergies  Meds  Problems         ROS:  Constitutional, HEENT, cardiovascular, pulmonary, gi and gu systems are negative, except as otherwise noted.    OBJECTIVE:     /73 (BP Location: Right arm, Patient Position: Chair, Cuff Size: Adult Regular)  Pulse 83  Temp 97.7  F (36.5  C) (Oral)  Wt 135 lb 1.6 oz (61.3 kg)  LMP 12/10/2010  SpO2 100%  BMI 24.71 kg/m2  Body mass index is 24.71 kg/(m^2).  GENERAL: healthy, alert and no distress  EYES: Eyes grossly normal to inspection, PERRL and conjunctivae and sclerae normal  HENT: ear canals and TM's normal, nose and mouth without ulcers or lesions  NECK: no adenopathy, no asymmetry, masses, or scars and thyroid normal to palpation  RESP: lungs clear to auscultation - no rales, rhonchi or wheezes  CV: regular rate and rhythm, normal S1 S2, no S3 or S4, no murmur, click or rub, no peripheral edema and peripheral pulses strong  MS: no gross musculoskeletal defects noted, no edema  PSYCH: mentation appears normal, affect normal/bright    Diagnostic Test  Results:  none     ASSESSMENT/PLAN:     1. Right maxillary sinusitis  Could still be viral at this time. I explained that she will either get better with current supportive interventions or she will worsen. I advised current interventions for the next 2-3 days and add Flonase. If not improving, fill and start Augmentin. Paper script provided.  - amoxicillin-clavulanate (AUGMENTIN) 875-125 MG per tablet; Take 1 tablet by mouth 2 times daily  Dispense: 20 tablet; Refill: 0    Push fluids, rest  Continue using your NetiPot  Start Flonase 2 sprays each nostril daily x2 weeks (over the counter)  If not improving in 2-3 days, fill and start the Augmentin. Take with food.  Tylenol/ibuprofen as needed for pain/fever  You can continue the Mucinex DM for the chest congestion.  Be sure to drink 64 oz water daily, especially while taking cold medications  Change positions slowly, especially if you are dizzy  If worsening or not improving after being on the antibiotic for 2-3 days, follow up with primary care provider in 1 week sooner if needed    See patient instructions.    KLEVER Dominguez [facility]

## 2018-01-31 NOTE — PATIENT INSTRUCTIONS
Push fluids, rest  Continue using your NetiPot  Start Flonase 2 sprays each nostril daily x2 weeks (over the counter)  If not improving in 2-3 days, fill and start the Augmentin. Take with food.  Tylenol/ibuprofen as needed for pain/fever  You can continue the Mucinex DM for the chest congestion.  Be sure to drink 64 oz water daily, especially while taking cold medications  Change positions slowly, especially if you are dizzy  If worsening or not improving after being on the antibiotic for 2-3 days, follow up with primary care provider in 1 week sooner if needed      Self-Care for Sinusitis     Drinking plenty of water can help sinuses drain.   Sinusitis can often be managed with self-care. Self-care can keep sinuses moist and make you feel more comfortable. Remember to follow your doctor's instructions closely. This can make a big difference in getting your sinus problem under control.  Drink fluids  Drinking extra fluids helps thin your mucus. This lets it drain from your sinuses more easily. Have a glass of water every hour or two. A humidifier helps in much the same way. Fluids can also offset the drying effects of certain medicines. If you use a humidifier, follow the product maker's instructions on how to use it. Clean it on a regular schedule.  Use saltwater rinses  Rinses help keep your sinuses and nose moist. Mix a teaspoon of salt in 8 ounces of fresh, warm water. Use a bulb syringe to gently squirt the water into your nose a few times a day. You can also buy ready-made saline nasal sprays.  Apply hot or cold packs  Applying heat to the area surrounding your sinuses may make you feel more comfortable. Use a hot water bottle or a hand towel dipped in hot water. Some people also find ice packs effective for relieving pain.  Medicines  Your doctor may prescribe medications to help treat your sinusitis. If you have an infection, antibiotics can help clear it up. If you are prescribed antibiotics, take all  pills on schedule until they are gone, even if you feel better. Decongestants help relieve swelling. Use decongestant sprays for short periods only under the direction of your doctor. If you have allergies, your doctor may prescribe medications to help relieve them.   Date Last Reviewed: 10/1/2016    5815-6987 [employer]. [address] [place] [address], [place] [address]. All rights reserved. This information is not intended as a substitute for professional medical care. Always follow your healthcare professional's instructions.

## 2018-01-31 NOTE — MR AVS SNAPSHOT
After Visit Summary   1/31/2018    Margarita See    MRN: [id number]           Patient Information     Date Of Birth          1960        Visit Information        Provider Department      1/31/2018 11:00 AM Margaret Buenrostro APRN [facility]        Today's Diagnoses     Right maxillary sinusitis    -  1      Care Instructions    Push fluids, rest  Continue using your NetiPot  Start Flonase 2 sprays each nostril daily x2 weeks (over the counter)  If not improving in 2-3 days, fill and start the Augmentin. Take with food.  Tylenol/ibuprofen as needed for pain/fever  You can continue the Mucinex DM for the chest congestion.  Be sure to drink 64 oz water daily, especially while taking cold medications  Change positions slowly, especially if you are dizzy  If worsening or not improving after being on the antibiotic for 2-3 days, follow up with primary care provider in 1 week sooner if needed      Self-Care for Sinusitis     Drinking plenty of water can help sinuses drain.   Sinusitis can often be managed with self-care. Self-care can keep sinuses moist and make you feel more comfortable. Remember to follow your doctor's instructions closely. This can make a big difference in getting your sinus problem under control.  Drink fluids  Drinking extra fluids helps thin your mucus. This lets it drain from your sinuses more easily. Have a glass of water every hour or two. A humidifier helps in much the same way. Fluids can also offset the drying effects of certain medicines. If you use a humidifier, follow the product maker's instructions on how to use it. Clean it on a regular schedule.  Use saltwater rinses  Rinses help keep your sinuses and nose moist. Mix a teaspoon of salt in 8 ounces of fresh, warm water. Use a bulb syringe to gently squirt the water into your nose a few times a day. You can also buy ready-made saline nasal sprays.  Apply hot or cold packs  Applying heat to the  area surrounding your sinuses may make you feel more comfortable. Use a hot water bottle or a hand towel dipped in hot water. Some people also find ice packs effective for relieving pain.  Medicines  Your doctor may prescribe medications to help treat your sinusitis. If you have an infection, antibiotics can help clear it up. If you are prescribed antibiotics, take all pills on schedule until they are gone, even if you feel better. Decongestants help relieve swelling. Use decongestant sprays for short periods only under the direction of your doctor. If you have allergies, your doctor may prescribe medications to help relieve them.   Date Last Reviewed: 10/1/2016    3040-7730 [employer]. [address] [place] [address], [place] [address]. All rights reserved. This information is not intended as a substitute for professional medical care. Always follow your healthcare professional's instructions.                Follow-ups after your visit        Who to contact     If you have questions or need follow up information about today's clinic visit or your schedule please contact [facility] directly at [phone].  Normal or non-critical lab and imaging results will be communicated to you by NuScriptRxhart, letter or phone within 4 business days after the clinic has received the results. If you do not hear from us within 7 days, please contact the clinic through ThermoCeramixt or phone. If you have a critical or abnormal lab result, we will notify you by phone as soon as possible.  Submit refill requests through Kipo or call your pharmacy and they will forward the refill request to us. Please allow 3 business days for your refill to be completed.          Additional Information About Your Visit        Kipo Information     Kipo gives you secure access to your electronic health record. If you see a primary care provider, you can also send messages to your care team and make appointments. If you have  questions, please call your primary care clinic.  If you do not have a primary care provider, please call [phone] and they will assist you.        Care EveryWhere ID     This is your Care EveryWhere ID. This could be used by other organizations to access your [place] medical records  [id number]        Your Vitals Were     Pulse Temperature Last Period Pulse Oximetry BMI (Body Mass Index)       83 97.7  F (36.5  C) (Oral) 12/10/2010 100% 24.71 kg/m2        Blood Pressure from Last 3 Encounters:   01/31/18 112/73   09/12/17 105/70   09/04/17 124/78    Weight from Last 3 Encounters:   01/31/18 135 lb 1.6 oz (61.3 kg)   09/12/17 130 lb (59 kg)   09/04/17 130 lb 9.6 oz (59.2 kg)              Today, you had the following     No orders found for display         Today's Medication Changes          These changes are accurate as of 1/31/18 11:06 AM.  If you have any questions, ask your nurse or doctor.               Start taking these medicines.        Dose/Directions    amoxicillin-clavulanate 875-125 MG per tablet   Commonly known as:  AUGMENTIN   Used for:  Right maxillary sinusitis   Started by:  Margaret Buenrostro APRN CNP        Dose:  1 tablet   Take 1 tablet by mouth 2 times daily   Quantity:  20 tablet   Refills:  0            Where to get your medicines      Some of these will need a paper prescription and others can be bought over the counter.  Ask your nurse if you have questions.     Bring a paper prescription for each of these medications     amoxicillin-clavulanate 875-125 MG per tablet                Primary Care Provider Office Phone # Fax #    Luke Dalal [phone]       [address]  [place] [facility] [address]        Equal Access to Services     [facility] AH: Hadshakira Hicks, waaxda luqadaha, qaybta kaalmada adeleannda, fatimah kemp. So [facility] [phone].    ATENCIÓN: Si habla español, tiene a hopson disposición servicios gratuitos de  asistencia lingüística. aJrett al [phone].    We comply with applicable federal civil rights laws and [place] laws. We do not discriminate on the basis of race, color, national origin, age, disability, sex, sexual orientation, or gender identity.            Thank you!     Thank you for choosing [facility]  for your care. Our goal is always to provide you with excellent care. Hearing back from our patients is one way we can continue to improve our services. Please take a few minutes to complete the written survey that you may receive in the mail after your visit with us. Thank you!             Your Updated Medication List - Protect others around you: Learn how to safely use, store and throw away your medicines at www.disposemymeds.org.          This list is accurate as of 1/31/18 11:06 AM.  Always use your most recent med list.                   Brand Name Dispense Instructions for use Diagnosis    amoxicillin-clavulanate 875-125 MG per tablet    AUGMENTIN    20 tablet    Take 1 tablet by mouth 2 times daily    Right maxillary sinusitis       CRANBERRY PO           FLAXSEED OIL PO           PROBIOTIC PO           venlafaxine 37.5 MG 24 hr capsule    EFFEXOR-XR     TAKE 1 CAPSULE BY MOUTH ONCE DAILY

## 2018-02-09 ENCOUNTER — OFFICE VISIT (OUTPATIENT)
Dept: FAMILY MEDICINE | Facility: CLINIC | Age: 58
End: 2018-02-09
Payer: COMMERCIAL

## 2018-02-09 ENCOUNTER — TELEPHONE (OUTPATIENT)
Dept: FAMILY MEDICINE | Facility: CLINIC | Age: 58
End: 2018-02-09

## 2018-02-09 VITALS
HEIGHT: 62 IN | DIASTOLIC BLOOD PRESSURE: 72 MMHG | OXYGEN SATURATION: 100 % | TEMPERATURE: 98.2 F | SYSTOLIC BLOOD PRESSURE: 109 MMHG | HEART RATE: 67 BPM | BODY MASS INDEX: 25.03 KG/M2 | WEIGHT: 136 LBS

## 2018-02-09 DIAGNOSIS — J06.9 VIRAL URI WITH COUGH: Primary | ICD-10-CM

## 2018-02-09 PROCEDURE — 99213 OFFICE O/P EST LOW 20 MIN: CPT | Performed by: NURSE PRACTITIONER

## 2018-02-09 ASSESSMENT — PAIN SCALES - GENERAL: PAINLEVEL: NO PAIN (0)

## 2018-02-09 NOTE — TELEPHONE ENCOUNTER
Patient was seen 01/31/18.  Patient was informed that the dizziness and headaches may be related to Effexor.  Patient stopped medication 2 days ago.  Side effects have improved.  However, patient states that she now is having chest congestion and a new productive cough.  Patient states symptoms have been worsening.     RN advised that patient be seen today.  Appointment made.    Next 5 appointments (look out 90 days)     Feb 09, 2018 11:40 AM CST   Office Visit with KLEVER Cunningham CNP   [facility] ([facility])    [address] [place] [address] [phone]                Elena De León RN

## 2018-02-09 NOTE — TELEPHONE ENCOUNTER
..Reason for Call:  Other     Detailed comments: Patient called she said she need to speak with someone about side effects with VENLAFAXINE.     Phone Number Patient can be reached at: Home number on file [phone] (home)    Best Time: YI    Can we leave a detailed message on this number? YES    Call taken on 2/9/2018 at 9:27 AM by Cr Clark

## 2018-02-09 NOTE — TELEPHONE ENCOUNTER
This writer attempted to contact Margarita on 02/09/18      Reason for call medication and left message to return call.      If patient calls back:   Patient contacted by clinic RN team. Inform patient that someone from the team will contact them, document that pt called and route to care team.         Elena De León RN

## 2018-02-09 NOTE — PROGRESS NOTES
SUBJECTIVE:   Margarita See is a 58 year old female who presents to clinic today for the following health issues:      Acute Illness   Acute illness concerns: sinus/congestion/cough x 3ddays  Onset: 2-3 weeks    Fever: no    Chills/Sweats: no    Headache (location?): no    Sinus Pressure:YES    Conjunctivitis:  no    Ear Pain: no    Rhinorrhea: no    Congestion: YES    Sore Throat: no     Cough: YES - tickle in throat    Wheeze: no    Decreased Appetite: no    Nausea: no    Vomiting: no    Diarrhea:  no    Dysuria/Freq.: no    Fatigue/Achiness: YES, fatigue    Sick/Strep Exposure: YES     Therapies Tried and outcome: mucinex d, nasal spray    58 year old female presents with the above concerns. Mostly postnasal drainage again and chest congestion. Gets a tickle and then starts coughing. Eyes water. Left work early today because of cough. 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 No fever. No chest pain, shortness of breath, or wheezing. Had infleunza vaccine. Seen 1/31 for sinus symptoms. She has been taking Flonase and Mucinex D. She never filled the Augmentin because she started to improve. See note from that visit. Feels like she got better after that illness and this might be a new one.     Quit Effexor and dizziness and headaches have resolved.     Problem list and histories reviewed & adjusted, as indicated.  Additional history: as documented    Patient Active Problem List   Diagnosis     CARDIOVASCULAR SCREENING; LDL GOAL LESS THAN 160     Allergic rhinitis     Generalized anxiety disorder     Menometrorrhagia     Recurrent sinusitis     Past Surgical History:   Procedure Laterality Date     COLONOSCOPY WITH CO2 INSUFFLATION N/A 7/22/2016    Procedure: COLONOSCOPY WITH CO2 INSUFFLATION;  Surgeon: Duane, William Charles, MD;  Location: [facility] OR     No surgical history         Social History   Substance Use Topics     Smoking status: Never Smoker     Smokeless tobacco: Never Used     Alcohol use Yes       "Comment: occasional     Family History   Problem Relation Age of Onset     DIABETES Mother      DIABETES Maternal Grandmother      Breast Cancer Paternal Aunt          Current Outpatient Prescriptions   Medication Sig Dispense Refill     Pseudoephedrine-Guaifenesin (MUCINEX D PO)        Probiotic Product (PROBIOTIC PO)        Flaxseed, Linseed, (FLAXSEED OIL PO)        CRANBERRY PO        Allergies   Allergen Reactions     Spinach Nausea and Vomiting       Reviewed and updated as needed this visit by clinical staff  Tobacco  Allergies  Meds  Problems  Med Hx  Surg Hx  Fam Hx  Soc Hx        Reviewed and updated as needed this visit by Provider  Allergies  Meds  Problems         ROS:  Constitutional, HEENT, cardiovascular, pulmonary, gi and gu systems are negative, except as otherwise noted.    OBJECTIVE:     /72 (BP Location: Right arm, Patient Position: Chair, Cuff Size: Adult Regular)  Pulse 67  Temp 98.2  F (36.8  C) (Oral)  Ht 5' 2\" (1.575 m)  Wt 136 lb (61.7 kg)  LMP 12/10/2010  SpO2 100%  BMI 24.87 kg/m2  Body mass index is 24.87 kg/(m^2).  GENERAL: healthy, alert and no distress  EYES: Eyes grossly normal to inspection, PERRL and conjunctivae and sclerae normal  HENT: ear canals and TM's normal, nose and mouth without ulcers or lesions  NECK: no adenopathy, no asymmetry, masses, or scars and thyroid normal to palpation  RESP: lungs clear to auscultation - no rales, rhonchi or wheezes  CV: regular rate and rhythm, normal S1 S2, no S3 or S4, no murmur, click or rub, no peripheral edema and peripheral pulses strong  MS: no gross musculoskeletal defects noted, no edema  PSYCH: mentation appears normal, affect normal/bright    Diagnostic Test Results:  none     ASSESSMENT/PLAN:       ICD-10-CM    1. Viral URI with cough J06.9     B97.89    Continue the Flonase 2 sprays each nostril daily x2 weeks  Start Mucinex DM (helps with cough/chest congestion)  Drink at least 64 oz water daily  If not " improving in 5-7 fill and start the Augmentin (from last visit)      See Patient Instructions    KLEVER Dominguez CNP  [facility]

## 2018-02-09 NOTE — MR AVS SNAPSHOT
After Visit Summary   2/9/2018    Margarita See    MRN: [id number]           Patient Information     Date Of Birth          1960        Visit Information        Provider Department      2/9/2018 11:40 AM Margaret Buenrostro APRN CNP [facility]        Today's Diagnoses     Viral URI with cough    -  1      Care Instructions    Continue the Flonase 2 sprays each nostril daily x2 weeks  Start Mucinex DM (helps with cough/chest congestion)  Drink at least 64 oz water daily  If not improving in 5-7 fill and start the Augmentin (from last visit)      At [facility], we strive to deliver an exceptional experience to you, every time we see you.  If you receive a survey in the mail, please send us back your thoughts. We really do value your feedback.    Based on your medical history, these are the current health maintenance/preventive care services that you are due for (some may have been done at this visit.)  Health Maintenance Due   Topic Date Due     TETANUS IMMUNIZATION (SYSTEM ASSIGNED)  01/07/1979     HEPATITIS C SCREENING  01/07/1979     ADVANCE DIRECTIVE PLANNING Q5 YRS  01/07/2016     LIPID MONITORING Q5 YEARS  03/09/2017     INFLUENZA VACCINE (SYSTEM ASSIGNED)  09/01/2017         Suggested websites for health information:  Www.[employer] : Up to date and easily searchable information on multiple topics.  Www.medlineplus.gov : medication info, interactive tutorials, watch real surgeries online  Www.familydoctor.org : good info from the Academy of Family Physicians  Www.cdc.gov : public health info, travel advisories, epidemics (H1N1)  Www.aap.org : children's health info, normal development, vaccinations  Www.health.[facility].[place].us : [place] dept of health, public health issues in [place], N1N1    Your care team:                            Family Medicine Internal Medicine   MD Yousuf Granger MD Shantel Branch-Fleming, MD Katya Georgiev PA-C Nam  MD [facility]   LENNOX Cisneros, MD Teena Benavidez CNP, MD Deborah Mielke, MD Kim Thein, APRN [facility]      Clinic hours: Monday - Thursday 7 am-7 pm; Fridays 7 am-5 pm.   Urgent care: Monday - Friday 11 am-9 pm; Saturday and Sunday 9 am-5 pm.  Pharmacy : Monday -Thursday 8 am-8 pm; Friday 8 am-6 pm; Saturday and Sunday 9 am-5 pm.     Clinic: [phone]   Pharmacy: [phone]    Viral Upper Respiratory Illness (Adult)  You have a viral upper respiratory illness (URI), which is another term for the common cold. This illness is contagious during the first few days. It is spread through the air by coughing and sneezing. It may also be spread by direct contact (touching the sick person and then touching your own eyes, nose, or mouth). Frequent handwashing will decrease risk of spread. Most viral illnesses go away within 7 to 10 days with rest and simple home remedies. Sometimes the illness may last for several weeks. Antibiotics will not kill a virus, and they are generally not prescribed for this condition.    Home care    If symptoms are severe, rest at home for the first 2 to 3 days. When you resume activity, don't let yourself get too tired.    Avoid being exposed to cigarette smoke (yours or others ).    You may use acetaminophen or ibuprofen to control pain and fever, unless another medicine was prescribed. (Note: If you have chronic liver or kidney disease, have ever had a stomach ulcer or gastrointestinal bleeding, or are taking blood-thinning medicines, talk with your healthcare provider before using these medicines.) Aspirin should never be given to anyone under 18 years of age who is ill with a viral infection or fever. It may cause severe liver or brain damage.    Your appetite may be poor, so a light diet is fine. Avoid dehydration by drinking 6 to 8 glasses of fluids per day (water, soft drinks, juices, tea, or soup). Extra fluids  will help loosen secretions in the nose and lungs.    Over-the-counter cold medicines will not shorten the length of time you re sick, but they may be helpful for the following symptoms: cough, sore throat, and nasal and sinus congestion. (Note: Do not use decongestants if you have high blood pressure.)  Follow-up care  Follow up with your healthcare provider, or as advised.  When to seek medical advice  Call your healthcare provider right away if any of these occur:    Cough with lots of colored sputum (mucus)    Severe headache; face, neck, or ear pain    Difficulty swallowing due to throat pain    Fever of 100.4 F (38 C)  Call 911, or get immediate medical care  Call emergency services right away if any of these occur:    Chest pain, shortness of breath, wheezing, or difficulty breathing    Coughing up blood    Inability to swallow due to throat pain  Date Last Reviewed: 9/13/2015 2000-2017 [employer]. [address] [place] [address]. All rights reserved. This information is not intended as a substitute for professional medical care. Always follow your healthcare professional's instructions.                Follow-ups after your visit        Who to contact     If you have questions or need follow up information about today's clinic visit or your schedule please contact [facility] directly at [phone].  Normal or non-critical lab and imaging results will be communicated to you by MyChart, letter or phone within 4 business days after the clinic has received the results. If you do not hear from us within 7 days, please contact the clinic through MyChart or phone. If you have a critical or abnormal lab result, we will notify you by phone as soon as possible.  Submit refill requests through Pruffi or call your pharmacy and they will forward the refill request to us. Please allow 3 business days for your refill to be completed.          Additional Information About  "Your Visit        MyChart Information     Asset Mappinghart gives you secure access to your electronic health record. If you see a primary care provider, you can also send messages to your care team and make appointments. If you have questions, please call your primary care clinic.  If you do not have a primary care provider, please call [phone] and they will assist you.        Care EveryWhere ID     This is your Care EveryWhere ID. This could be used by other organizations to access your [place] medical records  [id number]        Your Vitals Were     Pulse Temperature Height Last Period Pulse Oximetry BMI (Body Mass Index)    67 98.2  F (36.8  C) (Oral) 5' 2\" (1.575 m) 12/10/2010 100% 24.87 kg/m2       Blood Pressure from Last 3 Encounters:   02/09/18 109/72   01/31/18 112/73   09/12/17 105/70    Weight from Last 3 Encounters:   02/09/18 136 lb (61.7 kg)   01/31/18 135 lb 1.6 oz (61.3 kg)   09/12/17 130 lb (59 kg)              Today, you had the following     No orders found for display       Primary Care Provider Office Phone # Fax #    Luke Dalal [phone]       [address]  [facility] [address]        Equal Access to Services     [facility] : Hadii aad ku hadasho Soomaali, waaxda luqadaha, qaybta kaalmada adeegyada, waxay idiin hayaan chayito collins . So [facility] [phone].    ATENCIÓN: Si habla español, tiene a hopson disposición servicios gratuitos de asistencia lingüística. Llame al [phone].    We comply with applicable federal civil rights laws and [place] laws. We do not discriminate on the basis of race, color, national origin, age, disability, sex, sexual orientation, or gender identity.            Thank you!     Thank you for choosing [facility]  for your care. Our goal is always to provide you with excellent care. Hearing back from our patients is one way we can continue to improve our services. Please take a few minutes to complete the written " survey that you may receive in the mail after your visit with us. Thank you!             Your Updated Medication List - Protect others around you: Learn how to safely use, store and throw away your medicines at www.disposemymeds.org.          This list is accurate as of 2/9/18 12:04 PM.  Always use your most recent med list.                   Brand Name Dispense Instructions for use Diagnosis    CRANBERRY PO           FLAXSEED OIL PO           MUCINEX D PO           PROBIOTIC PO

## 2018-02-09 NOTE — TELEPHONE ENCOUNTER
returned call    Best number to reach caller: Home number on file [phone] (home)    Is it ok to leave a detailed message: YES

## 2018-02-09 NOTE — PATIENT INSTRUCTIONS
Continue the Flonase 2 sprays each nostril daily x2 weeks  Start Mucinex DM (helps with cough/chest congestion)  Drink at least 64 oz water daily  If not improving in 5-7 fill and start the Augmentin (from last visit)      At [facility], we strive to deliver an exceptional experience to you, every time we see you.  If you receive a survey in the mail, please send us back your thoughts. We really do value your feedback.    Based on your medical history, these are the current health maintenance/preventive care services that you are due for (some may have been done at this visit.)  Health Maintenance Due   Topic Date Due     TETANUS IMMUNIZATION (SYSTEM ASSIGNED)  01/07/1979     HEPATITIS C SCREENING  01/07/1979     ADVANCE DIRECTIVE PLANNING Q5 YRS  01/07/2016     LIPID MONITORING Q5 YEARS  03/09/2017     INFLUENZA VACCINE (SYSTEM ASSIGNED)  09/01/2017         Suggested websites for health information:  Www.[employer].Joyride : Up to date and easily searchable information on multiple topics.  Www.medlineplus.gov : medication info, interactive tutorials, watch real surgeries online  Www.familydoctor.org : good info from the Academy of Family Physicians  Www.cdc.gov : public health info, travel advisories, epidemics (H1N1)  Www.aap.org : children's health info, normal development, vaccinations  Www.health.[facility].[place].us : [place] dept of health, public health issues in [place], N1N1    Your care team:                            Family Medicine Internal Medicine   MD Yousuf Granger MD Shantel Branch-Fleming, MD Katya Georgiev PA-C Nam Ho, MD Pediatrics   LENNOX Cisneros, MARILU Parra APRN MD Teena Angel MD Deborah Mielke, MD Kim Thein, APRN CNP      Clinic hours: Monday - Thursday 7 am-7 pm; Fridays 7 am-5 pm.   Urgent care: Monday - Friday 11 am-9 pm; Saturday and Sunday 9 am-5 pm.  Pharmacy : Monday -Thursday 8 am-8 pm; Friday 8 am-6 pm;  Saturday and Sunday 9 am-5 pm.     Clinic: [phone]   Pharmacy: [phone]    Viral Upper Respiratory Illness (Adult)  You have a viral upper respiratory illness (URI), which is another term for the common cold. This illness is contagious during the first few days. It is spread through the air by coughing and sneezing. It may also be spread by direct contact (touching the sick person and then touching your own eyes, nose, or mouth). Frequent handwashing will decrease risk of spread. Most viral illnesses go away within 7 to 10 days with rest and simple home remedies. Sometimes the illness may last for several weeks. Antibiotics will not kill a virus, and they are generally not prescribed for this condition.    Home care    If symptoms are severe, rest at home for the first 2 to 3 days. When you resume activity, don't let yourself get too tired.    Avoid being exposed to cigarette smoke (yours or others ).    You may use acetaminophen or ibuprofen to control pain and fever, unless another medicine was prescribed. (Note: If you have chronic liver or kidney disease, have ever had a stomach ulcer or gastrointestinal bleeding, or are taking blood-thinning medicines, talk with your healthcare provider before using these medicines.) Aspirin should never be given to anyone under 18 years of age who is ill with a viral infection or fever. It may cause severe liver or brain damage.    Your appetite may be poor, so a light diet is fine. Avoid dehydration by drinking 6 to 8 glasses of fluids per day (water, soft drinks, juices, tea, or soup). Extra fluids will help loosen secretions in the nose and lungs.    Over-the-counter cold medicines will not shorten the length of time you re sick, but they may be helpful for the following symptoms: cough, sore throat, and nasal and sinus congestion. (Note: Do not use decongestants if you have high blood pressure.)  Follow-up care  Follow up with your healthcare provider, or as  advised.  When to seek medical advice  Call your healthcare provider right away if any of these occur:    Cough with lots of colored sputum (mucus)    Severe headache; face, neck, or ear pain    Difficulty swallowing due to throat pain    Fever of 100.4 F (38 C)  Call 911, or get immediate medical care  Call emergency services right away if any of these occur:    Chest pain, shortness of breath, wheezing, or difficulty breathing    Coughing up blood    Inability to swallow due to throat pain  Date Last Reviewed: 9/13/2015 2000-2017 [employer]. [address] [place] [address]. All rights reserved. This information is not intended as a substitute for professional medical care. Always follow your healthcare professional's instructions.

## 2018-02-14 ENCOUNTER — TELEPHONE (OUTPATIENT)
Dept: FAMILY MEDICINE | Facility: CLINIC | Age: 58
End: 2018-02-14

## 2018-02-14 NOTE — TELEPHONE ENCOUNTER
"Pt has urgency, all night had to go use a restroom.  \"Going into my kidney as I had it last time. Trying to drink plenty of fluid.\"  Lower ABD pain. Denies blood in the urine, cloudiness, sedimentation. Urine has some odor, not strong.    Pt is asking if Augmentin will cover UTI since she started taking it for upper respiratory infection.  Pt was informed that UA/UC need to be done to determine if she has UTI and what would be the ideal medication.    Please advise on UA.  Crhistine Garcia RN        "

## 2018-02-14 NOTE — TELEPHONE ENCOUNTER
Reason for Call:  Other     [facility] [address] [address] [facility] - [place], [place] - [address] [facility]    Detailed comments: Patient was just seen on 2/9/2018 and was prescribed medication and patient now thinks she has a UTI and asking if the medication would take care of the UTI?    Phone Number Patient can be reached at: Home number on file [phone] (home)    Best Time: ANY    Can we leave a detailed message on this number? YES    Call taken on 2/14/2018 at 4:17 PM by Sandy Hernández

## 2018-02-15 ENCOUNTER — OFFICE VISIT (OUTPATIENT)
Dept: FAMILY MEDICINE | Facility: CLINIC | Age: 58
End: 2018-02-15
Payer: COMMERCIAL

## 2018-02-15 VITALS
WEIGHT: 136 LBS | HEART RATE: 107 BPM | BODY MASS INDEX: 25.03 KG/M2 | SYSTOLIC BLOOD PRESSURE: 119 MMHG | DIASTOLIC BLOOD PRESSURE: 76 MMHG | HEIGHT: 62 IN | OXYGEN SATURATION: 97 % | TEMPERATURE: 98.7 F

## 2018-02-15 DIAGNOSIS — R30.0 DYSURIA: Primary | ICD-10-CM

## 2018-02-15 LAB

## 2018-02-15 PROCEDURE — 99213 OFFICE O/P EST LOW 20 MIN: CPT | Performed by: FAMILY MEDICINE

## 2018-02-15 PROCEDURE — 81001 URINALYSIS AUTO W/SCOPE: CPT | Performed by: FAMILY MEDICINE

## 2018-02-15 RX ORDER — PHENAZOPYRIDINE HYDROCHLORIDE 200 MG/1
200 TABLET, FILM COATED ORAL 3 TIMES DAILY PRN
Qty: 9 TABLET | Refills: 1 | Status: SHIPPED | OUTPATIENT
Start: 2018-02-15 | End: 2020-10-26

## 2018-02-15 ASSESSMENT — PAIN SCALES - GENERAL: PAINLEVEL: MILD PAIN (3)

## 2018-02-15 NOTE — MR AVS SNAPSHOT
After Visit Summary   2/15/2018    Margarita See    MRN: [id number]           Patient Information     Date Of Birth          1960        Visit Information        Provider Department      2/15/2018 3:20 PM Luke Dalal MD [facility]        Today's Diagnoses     Dysuria    -  1      Care Instructions    At [facility], we strive to deliver an exceptional experience to you, every time we see you.  If you receive a survey in the mail, please send us back your thoughts. We really do value your feedback.    Based on your medical history, these are the current health maintenance/preventive care services that you are due for (some may have been done at this visit.)  Health Maintenance Due   Topic Date Due     TETANUS IMMUNIZATION (SYSTEM ASSIGNED)  01/07/1979     HEPATITIS C SCREENING  01/07/1979     ADVANCE DIRECTIVE PLANNING Q5 YRS  01/07/2016     LIPID MONITORING Q5 YEARS  03/09/2017     INFLUENZA VACCINE (SYSTEM ASSIGNED)  09/01/2017         Suggested websites for health information:  Www.[employer] : Up to date and easily searchable information on multiple topics.  Www.medlineplus.gov : medication info, interactive tutorials, watch real surgeries online  Www.familydoctor.org : good info from the Academy of Family Physicians  Www.cdc.gov : public health info, travel advisories, epidemics (H1N1)  Www.aap.org : children's health info, normal development, vaccinations  Www.health.state[place]us : [place] dept of health, public health issues in [place], N1N1    Your care team:                            Family Medicine Internal Medicine   MD Yousuf Granger MD Shantel Branch-Fleming, MD Katya Georgiev PA-C Nam Ho, MD Pediatrics   LENNOX Cisneros, MD Teena Benavidez CNP, MD Deborah Mielke, MD Kim Thein, APRN CNP      Clinic hours: Monday - Thursday 7 am-7 pm; Fridays 7 am-5 pm.   Urgent care:  "Monday - Friday 11 am-9 pm; Saturday and Sunday 9 am-5 pm.  Pharmacy : Monday -Thursday 8 am-8 pm; Friday 8 am-6 pm; Saturday and Sunday 9 am-5 pm.     Clinic: [phone]   Pharmacy: [phone]            Follow-ups after your visit        Who to contact     If you have questions or need follow up information about today's clinic visit or your schedule please contact [facility] directly at [phone].  Normal or non-critical lab and imaging results will be communicated to you by Auterrahart, letter or phone within 4 business days after the clinic has received the results. If you do not hear from us within 7 days, please contact the clinic through [employer] or phone. If you have a critical or abnormal lab result, we will notify you by phone as soon as possible.  Submit refill requests through Raffstar or call your pharmacy and they will forward the refill request to us. Please allow 3 business days for your refill to be completed.          Additional Information About Your Visit        MyChart Information     [employer] gives you secure access to your electronic health record. If you see a primary care provider, you can also send messages to your care team and make appointments. If you have questions, please call your primary care clinic.  If you do not have a primary care provider, please call [phone] and they will assist you.        Care EveryWhere ID     This is your Care EveryWhere ID. This could be used by other organizations to access your [facility] medical records  [id number]        Your Vitals Were     Pulse Temperature Height Last Period Pulse Oximetry BMI (Body Mass Index)    107 98.7  F (37.1  C) (Oral) 5' 2\" (1.575 m) 12/10/2010 97% 24.87 kg/m2       Blood Pressure from Last 3 Encounters:   02/15/18 119/76   02/09/18 109/72   01/31/18 112/73    Weight from Last 3 Encounters:   02/15/18 136 lb (61.7 kg)   02/09/18 136 lb (61.7 kg)   01/31/18 135 lb 1.6 oz (61.3 kg)              We " Performed the Following     *UA reflex to Microscopic and Culture ([place] and [facility] (except [place] and [place])     Urine Microscopic          Today's Medication Changes          These changes are accurate as of 2/15/18  3:55 PM.  If you have any questions, ask your nurse or doctor.               Start taking these medicines.        Dose/Directions    phenazopyridine 200 MG tablet   Commonly known as:  PYRIDIUM   Used for:  Dysuria   Started by:  Luke Dalal MD        Dose:  200 mg   Take 1 tablet (200 mg) by mouth 3 times daily as needed for irritation   Quantity:  9 tablet   Refills:  1            Where to get your medicines      These medications were sent to [place] [address] [address] [employer] - [facility] [address] [facility]  [address] [facility] [address]     Phone:  [phone]     phenazopyridine 200 MG tablet                Primary Care Provider Office Phone # Fax #    Luke Dalal [phone]       [address] [place]  [facility] [address]        Equal Access to Services     [facility]: Hadii aad ku hadasho Soomaali, waaxda luqadaha, qaybta kaalmada adeegyada, waxay idiin hayaan chayito bhagatararadha collins . So [facility] [phone].    ATENCIÓN: Si habla español, tiene a hopson disposición servicios gratuitos de asistencia lingüística. Llame al [phone].    We comply with applicable federal civil rights laws and [place] laws. We do not discriminate on the basis of race, color, national origin, age, disability, sex, sexual orientation, or gender identity.            Thank you!     Thank you for choosing [facility]  for your care. Our goal is always to provide you with excellent care. Hearing back from our patients is one way we can continue to improve our services. Please take a few minutes to complete the written survey that you may receive in the mail after your visit with us. Thank you!             Your Updated Medication List - Protect others around you:  Learn how to safely use, store and throw away your medicines at www.disposemymeds.org.          This list is accurate as of 2/15/18  3:55 PM.  Always use your most recent med list.                   Brand Name Dispense Instructions for use Diagnosis    CRANBERRY PO           FLAXSEED OIL PO           MUCINEX D PO           phenazopyridine 200 MG tablet    PYRIDIUM    9 tablet    Take 1 tablet (200 mg) by mouth 3 times daily as needed for irritation    Dysuria       PROBIOTIC PO

## 2018-02-15 NOTE — TELEPHONE ENCOUNTER
.Reason for Call:  Other     Detailed comments: Patient is wondering if she needs to come in for lab and please call patient for advice     Phone Number Patient can be reached at: Other phone number:  [phone]    Best Time: any    Can we leave a detailed message on this number? NO    Call taken on 2/15/2018 at 9:58 AM by Saji Carter

## 2018-02-15 NOTE — PATIENT INSTRUCTIONS
At [facility], we strive to deliver an exceptional experience to you, every time we see you.  If you receive a survey in the mail, please send us back your thoughts. We really do value your feedback.    Based on your medical history, these are the current health maintenance/preventive care services that you are due for (some may have been done at this visit.)  Health Maintenance Due   Topic Date Due     TETANUS IMMUNIZATION (SYSTEM ASSIGNED)  01/07/1979     HEPATITIS C SCREENING  01/07/1979     ADVANCE DIRECTIVE PLANNING Q5 YRS  01/07/2016     LIPID MONITORING Q5 YEARS  03/09/2017     INFLUENZA VACCINE (SYSTEM ASSIGNED)  09/01/2017         Suggested websites for health information:  Www.[employer].org : Up to date and easily searchable information on multiple topics.  Www.medlineplus.gov : medication info, interactive tutorials, watch real surgeries online  Www.familydoctor.org : good info from the Academy of Family Physicians  Www.cdc.gov : public health info, travel advisories, epidemics (H1N1)  Www.aap.org : children's health info, normal development, vaccinations  Www.health.[facility].[place].us : [place] dept of health, public health issues in [place], N1N1    Your care team:                            Family Medicine Internal Medicine   MD Yousuf Granger MD Shantel Branch-Fleming, MD Katya Georgiev PA-C Nam Ho, MD Pediatrics   LENNOX Cisneros, MD Teena Benavidez CNP, MD Deborah Mielke, MD Kim Thein, APRN CNP      Clinic hours: Monday - Thursday 7 am-7 pm; Fridays 7 am-5 pm.   Urgent care: Monday - Friday 11 am-9 pm; Saturday and Sunday 9 am-5 pm.  Pharmacy : Monday -Thursday 8 am-8 pm; Friday 8 am-6 pm; Saturday and Sunday 9 am-5 pm.     Clinic: [phone]   Pharmacy: [phone]

## 2018-02-15 NOTE — TELEPHONE ENCOUNTER
Patient calling back in. She states she also have a low grade fever as well and wanted it to be noted.

## 2018-02-15 NOTE — TELEPHONE ENCOUNTER
I think she should be seen given her worsening symptoms and now fever, especially since she has concern for pyelonephritis.

## 2018-02-15 NOTE — TELEPHONE ENCOUNTER
Called and spoke with patient regarding symptoms below. Pt having UTI like symptoms for last few days (see documentation below for more details). Writer advised that previous message was sent to Margaret Buenrostro CNP and provider pt saw on 2/9/18, but writer advised had not heard back from Margaret yet and that she was not in office today so may not respond to message until back in office. Writer advised that with these new and ongoing symptoms, is best that she be seen in clinic for evaluation and that way sample can be obtained and reviewed. Pt agreed with this plan. Pt requesting to see PCP, Dr. Dalal today, if available. Writer assisted pt in scheduling appt for today at 3:20 pm with Dr. Dalal for symptoms. Pt agreed.     Nyasia Vealsquez RN  [facility] Triage

## 2018-02-15 NOTE — PROGRESS NOTES
SUBJECTIVE:   Margarita See is a 58 year old female who presents to clinic today for the following health issues:      ABDOMINAL PAIN     Onset: few days    Description:   Character: Cramping  Location: pelvic region  Radiation: none.    Intensity: moderate    Progression of Symptoms:  same    Accompanying Signs & Symptoms:  Fever/Chills?: no. 99.4 last night  Gas/Bloating: YES  Nausea: YES  Vomitting: YES  Diarrhea?: YES, just finished amoxicillin  Constipation:YES, today  Dysuria or Hematuria: no, but more frequent   History:   Trauma: no   Previous similar pain: no    Previous tests done: none    Precipitating factors:   Does the pain change with:     Food: no      BM: YES- slightly improved    Urination: no     Alleviating factors:  None.    Therapies Tried and outcome: none.    LMP:  not applicable       Problem list and histories reviewed & adjusted, as indicated.  Additional history: as documented    Patient Active Problem List   Diagnosis     CARDIOVASCULAR SCREENING; LDL GOAL LESS THAN 160     Allergic rhinitis     Generalized anxiety disorder     Menometrorrhagia     Recurrent sinusitis     Past Surgical History:   Procedure Laterality Date     COLONOSCOPY WITH CO2 INSUFFLATION N/A 7/22/2016    Procedure: COLONOSCOPY WITH CO2 INSUFFLATION;  Surgeon: Duane, William Charles, MD;  Location: [facility] OR     No surgical history         Social History   Substance Use Topics     Smoking status: Never Smoker     Smokeless tobacco: Never Used     Alcohol use Yes      Comment: occasional     Family History   Problem Relation Age of Onset     DIABETES Mother      DIABETES Maternal Grandmother      Breast Cancer Paternal Aunt            Reviewed and updated as needed this visit by clinical staff  Tobacco  Allergies  Meds  Med Hx  Surg Hx  Fam Hx  Soc Hx      Reviewed and updated as needed this visit by Provider         ROS:  Constitutional, HEENT, cardiovascular, pulmonary, GI, , musculoskeletal, neuro, skin,  "endocrine and psych systems are negative, except as otherwise noted.    OBJECTIVE:     /76 (BP Location: Left arm, Patient Position: Chair, Cuff Size: Adult Regular)  Pulse 107  Temp 98.7  F (37.1  C) (Oral)  Ht 5' 2\" (1.575 m)  Wt 136 lb (61.7 kg)  LMP 12/10/2010  SpO2 97%  BMI 24.87 kg/m2  Body mass index is 24.87 kg/(m^2).  GENERAL: healthy, alert and no distress  NECK: no adenopathy, no asymmetry, masses, or scars and thyroid normal to palpation  RESP: lungs clear to auscultation - no rales, rhonchi or wheezes  CV: regular rate and rhythm, normal S1 S2, no S3 or S4, no murmur, click or rub, no peripheral edema and peripheral pulses strong  ABDOMEN: soft, nontender, no hepatosplenomegaly, no masses and bowel sounds normal  MS: no gross musculoskeletal defects noted, no edema    Diagnostic Test Results:  none     ASSESSMENT/PLAN:     1. Dysuria  Benign exam. UA showed no UTI but patient just took amoxicillin yesterday night. Still can be cystitis that is being treated. Patient feels better. Continue with abx full course. May use Pyridium as needed.  - *UA reflex to Microscopic and Culture ([place] and [facility] (except [place] and [place])  - Urine Microscopic  - phenazopyridine (PYRIDIUM) 200 MG tablet; Take 1 tablet (200 mg) by mouth 3 times daily as needed for irritation  Dispense: 9 tablet; Refill: 1    See Patient Instructions    Luke Dalal MD, MD  [facility]  "

## 2018-02-27 ENCOUNTER — TELEPHONE (OUTPATIENT)
Dept: FAMILY MEDICINE | Facility: CLINIC | Age: 58
End: 2018-02-27

## 2018-02-27 DIAGNOSIS — R19.5 LOOSE STOOLS: ICD-10-CM

## 2018-02-27 DIAGNOSIS — R19.5 LOOSE STOOLS: Primary | ICD-10-CM

## 2018-02-27 PROCEDURE — 83516 IMMUNOASSAY NONANTIBODY: CPT | Mod: 91 | Performed by: FAMILY MEDICINE

## 2018-02-27 PROCEDURE — 36415 COLL VENOUS BLD VENIPUNCTURE: CPT | Performed by: FAMILY MEDICINE

## 2018-02-27 PROCEDURE — 83516 IMMUNOASSAY NONANTIBODY: CPT | Performed by: FAMILY MEDICINE

## 2018-02-27 NOTE — TELEPHONE ENCOUNTER
.Reason for call:  Patient reporting a symptom    Symptom or request: gas pains all night-maybe a bladder infection     Duration (how long have symptoms been present): yesterday     Have you been treated for this before? No    Additional comments: Patient wondering if she might have a bladder infection due to the gas pain she has.    Phone Number patient can be reached at:  Cell number on file:    Telephone Information:   Mobile [phone]       Best Time:  any    Can we leave a detailed message on this number:  YES    Call taken on 2/27/2018 at 11:02 AM by Saji Carter

## 2018-02-27 NOTE — TELEPHONE ENCOUNTER
Please notify patient that I have ordered blood tests to rule out celiac disease. Also since this has been ongoing, would like patient to see a GI specialist for further evaluation. Patient referred to [facility] [phone].    Luke Dalal MD

## 2018-02-27 NOTE — TELEPHONE ENCOUNTER
Called and spoke with patient. Advised on below per Dr. Dalal. Pt understanding and will schedule lab only appt for blood test for celiac disease and will follow up with [facility] regarding symptoms. Scheduling line to [facility] given. Pt agreed with plan. No questions or concerns at this time.    Nyasia Velasquez RN  [facility]

## 2018-02-27 NOTE — TELEPHONE ENCOUNTER
"Called and spoke with patient regarding symptoms below. Pt reporting that the gas pains she has had in the past are returning, along with the abdominal cramping and abdominal pain. Pt stating this has been an ongoing issue for patient and Dr. Dalal is aware. Pt states she was up last night with gas pains and having to go to the bathroom. Pt states that her stools have looked darker, but denies blood, and that there was some mucus seen in her stools. Pt stated she had a colonoscopy done last year and everything was \"good\". Pt denies any nausea or vomiting or fever. Denies any diarrhea or foul smelling stools and feels \"ok\" now today but all symptoms she has had before and thought issues were resolved but feels they are all coming back again now. PT does report some pain in her back, from shoulder blades on down. Pt was last seen 2/15/18 for similar symptoms and assessed for UTI and sample came back negative, but pt was advised to continue with Abx and finish course of Amoxicillin. Pt stated she has been on a lot of medication lately and wondering if any of this has to do with current symptoms. Pt not currently taking any medications, reports to only using ocean nasal spray when needed. PT isn't sure what to do anymore regarding these symptoms and does not want to take any more ABx or medications if can help it. There is mention of diverticulitis in pt chart history, but pt stated that she and Dr. Dalal talked about this and said provider felt this was not likely what was going on with her. RN suggested that pt likely to be seen in office for evaluation but pt requesting message be sent to PCP first to get opinion. Pt wondering if there is anything she should be doing at home or wondering if diet/food related instead? Pt wanting to be notified of provider opinion. Pt is home today from work so ok to call # listed above.    Routing to provider to review and advise.    Nyasia Velasquez RN  [facility] Triage    "

## 2018-03-01 LAB
TTG IGA SER-ACNC: <1 U/ML
TTG IGG SER-ACNC: <1 U/ML

## 2018-03-02 ENCOUNTER — TRANSFERRED RECORDS (OUTPATIENT)
Dept: HEALTH INFORMATION MANAGEMENT | Facility: CLINIC | Age: 58
End: 2018-03-02

## 2018-03-02 ENCOUNTER — TELEPHONE (OUTPATIENT)
Dept: FAMILY MEDICINE | Facility: CLINIC | Age: 58
End: 2018-03-02

## 2018-03-02 NOTE — TELEPHONE ENCOUNTER
Reason for Call:  Request for results:    Name of test or procedure: Results    Date of test of procedure: Tissue Transglutaminase Antibody IgA    Location of the test or procedure: [facility]    OK to leave the result message on voice mail or with a family member? YES    Phone number Patient can be reached at:  Home number on file [phone] (home)    Additional comments: Pt calling for she came in for lab visit on 02/27/18 and would like a call back to discuss lab results.    Call taken on 3/2/2018 at 12:42 PM by Janes Deal

## 2018-04-30 ENCOUNTER — TELEPHONE (OUTPATIENT)
Dept: FAMILY MEDICINE | Facility: CLINIC | Age: 58
End: 2018-04-30

## 2018-04-30 NOTE — TELEPHONE ENCOUNTER
This writer attempted to contact Margarita on 04/30/18      Reason for call triage symptoms and left detailed message.      If patient calls back:  IF RED FLAG ALLERGIC REACTION LYNNANI RN. IF SEASONAL ALLERGY SYMPTOMS SEE BELOW:   Patient contacted by a Registered Nurse. Inform patient that someone from the RN group will contact them, document that pt called and route to P DYAD 3 RN POOL [[id number]]      Rosangela Park RN

## 2018-04-30 NOTE — TELEPHONE ENCOUNTER
Margarita See is a 58 year old female who calls with seasonal allergy symptoms.    NURSING ASSESSMENT:  Description:  Patient reports since weather has gotten nicer in the last week or two she has begun having seasonal allergy symptoms. Reports this has not occurred for a couple of years.   Onset/duration:  1-2 weeks  Precip. factors:  Season allergy season beginning  Associated symptoms:  Rhinorrhea, post-nasal drip, cough induced by mucus running down throat. Sneezing and itchy/watery eyes at times.   Improves/worsens symptoms:  Nothing, patient has not tried anything for symptoms.  Pain scale (0-10)   0/10  Last exam/Treatment:  2/15/18  Allergies:   Allergies   Allergen Reactions     Spinach Nausea and Vomiting       MEDICATIONS:   Patient reports in the past she has been prescribed Flonase. Many years ago she also tried Zyrtec and Claritin D. Did not take long-term.  Taking medication(s) as prescribed? Yes  Taking over the counter medication(s?) Yes  Any medication side effects? No significant side effects    Any barriers to taking medication(s) as prescribed?  No  Medication(s) improving/managing symptoms?  No  Medication reconciliation completed: Yes      NURSING PLAN: Nursing advice to patient home care and OTC remedies    RECOMMENDED DISPOSITION:  Home care advice - Instructed patient to try OTC antihistamine such as Claritin D or Zyrtec, as patient has used both of these medications in the past and she remembers them to be effective. Instructed patient not to use nasal sprays unless prescribed by PCP, patient was previously prescribed more than 1 year ago - instructed that if she does use nasal sprays that she should not use > 5 days. Instructed to shower and wash hair at night and after having exposure to pollen, dust, or known irritants. Patient reports she has heard that mold/tree pollen are bad right now and effecting those with seasonal allergies. When pollen counts are high, usually in the AM,  patient should say indoors with doors and windows closed to reduce exposure. For itchy eyes, apply a cold compress to the eyelids. Avoid pollen and other irritants that worsen the problem. Patient instructed to call back in a couple days to a week after starting OTC medication and attempting home care remedies if symptoms persist or worsen. Patient instructed to call sooner if the following are present: fever, sinus pressure/pain, green/brown/or yellow nasal discharge/sputum, earache, uncontrolled coughing, persistent wheezing/coughing that is unresponsive to home care measures. Instructed to call 911 if chest pain or difficulty breathing occurs, patient states understanding to all of the above.   Will comply with recommendation: Yes  If further questions/concerns or if symptoms do not improve, worsen or new symptoms develop, call your PCP or [facility] as soon as possible.      Guideline used:  Telephone Triage Protocols for Nurses, Fifth Edition, Afshan Park RN

## 2018-04-30 NOTE — TELEPHONE ENCOUNTER
Reason for call:  Patient reporting a symptom    Symptom or request: allergies     Duration (how long have symptoms been present): 3 days    Have you been treated for this before? Yes    Additional comments: patient would like to speak with a nurse to get advice. Please call patient to further discuss. Thanks.     Phone Number patient can be reached at:  Cell number on file:    Telephone Information:   Mobile [phone]       Best Time:  Anytime     Can we leave a detailed message on this number:  YES    Call taken on 4/30/2018 at 11:17 AM by Julisa Bernal\

## 2018-04-30 NOTE — TELEPHONE ENCOUNTER
patient returned call    Best number to reach caller: Cell number on file:    Telephone Information:   Mobile [phone]       Is it ok to leave a detailed message: YES

## 2018-08-22 ENCOUNTER — TRANSFERRED RECORDS (OUTPATIENT)
Dept: HEALTH INFORMATION MANAGEMENT | Facility: CLINIC | Age: 58
End: 2018-08-22

## 2018-08-25 ENCOUNTER — RADIANT APPOINTMENT (OUTPATIENT)
Dept: MAMMOGRAPHY | Facility: CLINIC | Age: 58
End: 2018-08-25
Payer: COMMERCIAL

## 2018-08-25 DIAGNOSIS — Z12.31 VISIT FOR SCREENING MAMMOGRAM: ICD-10-CM

## 2018-08-25 PROCEDURE — 77067 SCR MAMMO BI INCL CAD: CPT | Mod: TC

## 2018-10-22 ENCOUNTER — TELEPHONE (OUTPATIENT)
Dept: FAMILY MEDICINE | Facility: CLINIC | Age: 58
End: 2018-10-22

## 2018-10-22 NOTE — TELEPHONE ENCOUNTER
Reason for Call:  Other call back    Detailed comments: Margarita called and asked that a nurse call her back. She was at lunch at 12:30 and experienced a tightness in her chest that radiated up to her ears. It has gone away now and she refused immediate triage. 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 Please call to advise.  Thank you     Phone Number Patient can be reached at: [phone] (W)    Best Time: Any    Can we leave a detailed message on this number? YES    Call taken on 10/22/2018 at 1:10 PM by Ulises Sue

## 2018-10-22 NOTE — TELEPHONE ENCOUNTER
This writer attempted to contact Margarita on 10/22/18      Reason for call triage symptoms and left message.      If patient calls back:   Patient contacted by a Registered Nurse. Inform patient that someone from the RN group will contact them, document that pt called and route to P DYAD 3 RN POOL [[id number]]          Katie Xiong RN, BSN

## 2018-10-22 NOTE — TELEPHONE ENCOUNTER
Margarita See is a 58 year old female who calls with chest pressure that she experience this afternoon.     PRESENTING PROBLEM:  She describes the sensation in her right chest that she had as an experience that lasted for a short while.    NURSING ASSESSMENT:  Patient complains of chest pain and chest pressure/discomfort.  Onset:  Today at about 12:30 PM.   Pain is characterized as pressure and soreness  Severity moderate  Located right chest   Radiates to left neck/jaw.  Duration short duration only today.  Associated symptoms none.  Exacerbated by nothing.  Relieved by went away soon after it started. She has not had this sensation before. It has not returned today. She reports that she is feeling fine tonight and does not have any chest pain, SOB, Chest pressure tonight. She denies nausea, vomiting, heartburn, anxiety attacks.   She reports that she has been doing some push ups and exercises.   Allergies:   Allergies   Allergen Reactions     Spinach Nausea and Vomiting     Last exam/Treatment:  2/15/18    NURSING PLAN: Routed to provider Yes    RECOMMENDED DISPOSITION:  Call the clinic back to for RN to triage if sensation returns. RN encouraged an appointment to follow up with PCP. She is going to call the clinic in the morning and set up an appointment.  Will comply with recommendation: Yes  If further questions/concerns or if symptoms do not improve, worsen or new symptoms develop, call your PCP or [facility] as soon as possible.      Guideline used:  Telephone Triage Protocols for Nurses, Fifth Edition, Afshan Olson RN

## 2019-03-20 ENCOUNTER — OFFICE VISIT (OUTPATIENT)
Dept: URGENT CARE | Facility: URGENT CARE | Age: 59
End: 2019-03-20

## 2019-03-20 VITALS
TEMPERATURE: 97.7 F | SYSTOLIC BLOOD PRESSURE: 133 MMHG | OXYGEN SATURATION: 99 % | RESPIRATION RATE: 18 BRPM | HEART RATE: 81 BPM | DIASTOLIC BLOOD PRESSURE: 80 MMHG

## 2019-03-20 DIAGNOSIS — J01.00 ACUTE MAXILLARY SINUSITIS, RECURRENCE NOT SPECIFIED: Primary | ICD-10-CM

## 2019-03-20 DIAGNOSIS — J06.9 VIRAL UPPER RESPIRATORY ILLNESS: ICD-10-CM

## 2019-03-20 PROCEDURE — 99214 OFFICE O/P EST MOD 30 MIN: CPT | Performed by: PHYSICIAN ASSISTANT

## 2019-03-20 ASSESSMENT — ENCOUNTER SYMPTOMS
SINUS PRESSURE: 1
DIARRHEA: 0
NAUSEA: 0
COUGH: 0
HEMATOLOGIC/LYMPHATIC NEGATIVE: 1
EYES NEGATIVE: 1
DIZZINESS: 0
NECK PAIN: 0
NECK STIFFNESS: 0
ALLERGIC/IMMUNOLOGIC NEGATIVE: 1
PALPITATIONS: 0
BRUISES/BLEEDS EASILY: 0
WOUND: 0
VOMITING: 0
MUSCULOSKELETAL NEGATIVE: 1
SORE THROAT: 0
CARDIOVASCULAR NEGATIVE: 1
ARTHRALGIAS: 0
BACK PAIN: 0
SHORTNESS OF BREATH: 0
FEVER: 0
RHINORRHEA: 0
MYALGIAS: 0
ENDOCRINE NEGATIVE: 1
JOINT SWELLING: 0
HEADACHES: 0
WEAKNESS: 0
CHILLS: 0
LIGHT-HEADEDNESS: 0

## 2019-03-20 NOTE — PROGRESS NOTES
Chief Complaint:     Chief Complaint   Patient presents with     URI     sinus and ear x 1 weeks       HPI: Margarita See is an 59 year old female who presents with nasal congestion and ear pain. It began  1 week(s) ago and has unchanged.  Cough is nonproductive, occasional There is no shortness of breath, wheezing and chest pain.      Recent travel?  no.      ROS:     Review of Systems   Constitutional: Negative for chills and fever.   HENT: Positive for congestion, ear pain and sinus pressure. Negative for rhinorrhea and sore throat.    Eyes: Negative.    Respiratory: Negative for cough and shortness of breath.    Cardiovascular: Negative.  Negative for chest pain and palpitations.   Gastrointestinal: Negative for diarrhea, nausea and vomiting.   Endocrine: Negative.    Genitourinary: Negative.    Musculoskeletal: Negative.  Negative for arthralgias, back pain, joint swelling, myalgias, neck pain and neck stiffness.   Skin: Negative.  Negative for rash and wound.   Allergic/Immunologic: Negative.  Negative for immunocompromised state.   Neurological: Negative for dizziness, weakness, light-headedness and headaches.   Hematological: Negative.  Does not bruise/bleed easily.        Respiratory History  occasional episodes of bronchitis       Family History   Family History   Problem Relation Age of Onset     Diabetes Mother      Diabetes Maternal Grandmother      Breast Cancer Paternal Aunt         Problem history  Patient Active Problem List   Diagnosis     CARDIOVASCULAR SCREENING; LDL GOAL LESS THAN 160     Allergic rhinitis     Generalized anxiety disorder     Menometrorrhagia     Recurrent sinusitis        Allergies  Allergies   Allergen Reactions     Spinach Nausea and Vomiting        Social History  Social History     Socioeconomic History     Marital status:      Spouse name: Not on file     Number of children: Not on file     Years of education: Not on file     Highest education level: Not on file    Occupational History     Not on file   Social Needs     Financial resource strain: Not on file     Food insecurity:     Worry: Not on file     Inability: Not on file     Transportation needs:     Medical: Not on file     Non-medical: Not on file   Tobacco Use     Smoking status: Never Smoker     Smokeless tobacco: Never Used   Substance and Sexual Activity     Alcohol use: Yes     Comment: occasional     Drug use: No     Sexual activity: Yes     Partners: Male     Comment: vas   Lifestyle     Physical activity:     Days per week: Not on file     Minutes per session: Not on file     Stress: Not on file   Relationships     Social connections:     Talks on phone: Not on file     Gets together: Not on file     Attends Sabianism service: Not on file     Active member of club or organization: Not on file     Attends meetings of clubs or organizations: Not on file     Relationship status: Not on file     Intimate partner violence:     Fear of current or ex partner: Not on file     Emotionally abused: Not on file     Physically abused: Not on file     Forced sexual activity: Not on file   Other Topics Concern     Parent/sibling w/ CABG, MI or angioplasty before 65F 55M? No   Social History Narrative     Not on file        Current Meds    Current Outpatient Medications:      CRANBERRY PO, , Disp: , Rfl:      Flaxseed, Linseed, (FLAXSEED OIL PO), , Disp: , Rfl:      Probiotic Product (PROBIOTIC PO), , Disp: , Rfl:      phenazopyridine (PYRIDIUM) 200 MG tablet, Take 1 tablet (200 mg) by mouth 3 times daily as needed for irritation (Patient not taking: Reported on 3/20/2019), Disp: 9 tablet, Rfl: 1     Pseudoephedrine-Guaifenesin (MUCINEX D PO), , Disp: , Rfl:         OBJECTIVE     Vital signs reviewed by Kannan Lazcano  /80 (BP Location: Left arm, Patient Position: Chair, Cuff Size: Adult Regular)   Pulse 81   Temp 97.7  F (36.5  C) (Oral)   Resp 18   LMP 12/10/2010   SpO2 99%      Physical Exam   Constitutional:  She is oriented to person, place, and time. She appears well-developed and well-nourished. She is cooperative.  Non-toxic appearance. She does not have a sickly appearance. She does not appear ill. No distress.   HENT:   Head: Normocephalic and atraumatic.   Right Ear: Hearing, tympanic membrane, external ear and ear canal normal. Tympanic membrane is not perforated, not erythematous, not retracted and not bulging.   Left Ear: Hearing, tympanic membrane, external ear and ear canal normal. Tympanic membrane is not perforated, not erythematous, not retracted and not bulging.   Nose: Mucosal edema present. No rhinorrhea. Right sinus exhibits no maxillary sinus tenderness and no frontal sinus tenderness. Left sinus exhibits no maxillary sinus tenderness and no frontal sinus tenderness.   Mouth/Throat: Mucous membranes are normal. Posterior oropharyngeal erythema present. No oropharyngeal exudate, posterior oropharyngeal edema or tonsillar abscesses. Tonsils are 0 on the right. Tonsils are 0 on the left. No tonsillar exudate.   Eyes: EOM are normal. Pupils are equal, round, and reactive to light. Right eye exhibits no discharge. Left eye exhibits no discharge.   Neck: Normal range of motion. Neck supple.   Cardiovascular: Normal rate, regular rhythm, normal heart sounds and intact distal pulses. Exam reveals no gallop and no friction rub.   No murmur heard.  Pulmonary/Chest: Effort normal and breath sounds normal. No respiratory distress. She has no decreased breath sounds. She has no wheezes. She has no rhonchi. She has no rales. She exhibits no tenderness.   Abdominal: Soft. Bowel sounds are normal. She exhibits no distension and no mass. There is no tenderness. There is no guarding.   Lymphadenopathy:     She has no cervical adenopathy.   Neurological: She is alert and oriented to person, place, and time. She has normal reflexes. No cranial nerve deficit.   Skin: Skin is warm and dry. She is not diaphoretic.    Psychiatric: She has a normal mood and affect. Her behavior is normal. Judgment and thought content normal.   Nursing note and vitals reviewed.        Labs:       Medical Decision Making:    Differential Diagnosis:  URI Adult/Peds:  Acute right otitis media, Acute left otitis media, Bronchitis-viral, Pneumonia, Sinusitis, Viral syndrome and Viral upper respiratory illness        ASSESSMENT    1. Acute maxillary sinusitis, recurrence not specified    2. Viral upper respiratory illness        PLAN  Patient presents with 1 week of ear pain, and sinus pressure.  Patient is in no acute distress and is running a temp of 97.7 in clinic today.  Lung sounds were clear and O2 sats at 99% on RA.  Imaging to rule out pneumonia is not indicated at this time.  Rest, Push fluids, warm packs to sinus areas, vaporizer, elevation of head of bed.  Nasal decongestant.  Ibuprofen and or Tylenol for any fever or body aches.  If symptoms worsen, recheck immediately otherwise follow up with your PCP in 1 week if symptoms are not improving.  Worrisome symptoms discussed with instructions to go to the ED.  Patient verbalized understanding and agreed with this plan.         Kannan Lazcano  3/20/2019, 4:23 PM

## 2019-03-20 NOTE — PATIENT INSTRUCTIONS
Patient Education     Sinusitis (No Antibiotics)    The sinuses are air-filled spaces within the bones of the face. They connect to the inside of the nose. Sinusitis is an inflammation of the tissue that lines the sinuses. Sinusitis can occur during a cold. It can also happen due to allergies to pollens and other particles in the air. It can cause symptoms such as sinus congestion, headache, sore throat, facial swelling, and a feeling of fullness. It may also cause a low-grade fever. Your sinusitis does not include an infection with bacteria. Because of this, antibiotics are not used to treat this problem.  Home care    Drink plenty of water, hot tea, and other liquids. This may help thin nasal mucus. It also may help your sinuses drain fluids.    Heat may help soothe painful areas of your face. Use a towel soaked in hot water. Or,  the shower and direct the warm spray onto your face. Using a vaporizer along with a menthol rub at night may also help soothe symptoms.     An expectorant with guaifenesin may help thin nasal mucus and help your sinuses drain fluids.    You can use an over-the-counter decongestant, unless a similar medicine was prescribed to you. Nasal sprays work the fastest. Use one that contains phenylephrine or oxymetazoline. First blow your nose gently. Then use the spray. Do not use these medicines more often than directed on the label. If you do, your symptoms may get worse. You may also take pills that contain pseudoephedrine. Don t use products that combine multiple medicines. This is because side effects may be increased. Read all medicine labels. You can also ask the pharmacist for help. (People with high blood pressure should not use decongestants. They can raise blood pressure.)    Over-the-counter antihistamines may help if allergies contributed to your sinusitis.      Use acetaminophen or ibuprofen to control pain, unless another pain medicine was prescribed to you. If you have  chronic liver or kidney disease or ever had a stomach ulcer, talk with your healthcare provider before using these medicines. (Aspirin should never be taken by anyone under age 18 who is ill with a fever. It may cause severe liver damage.)    Use nasal rinses or irrigation as instructed by your healthcare provider.    Don't smoke. This can make symptoms worse.  Follow-up care  Follow up with your healthcare provider or our staff if you are NOT better in 1 week.  When to seek medical advice  Call your healthcare provider if any of these occur:    Green or yellow fluid draining from your nose or into your throat    Facial pain or headache that gets worse    Stiff neck    Unusual drowsiness or confusion    Swelling of your forehead or eyelids    Vision problems, such as blurred or double vision    Fever of 100.4 F (38 C) or higher, or as directed by your healthcare provider    Seizure    Breathing problems    Symptoms that don't go away in 10 days  Date Last Reviewed: 11/1/2017 2000-2018 [email or web address]. [address] [place] [address], [place] [address]. All rights reserved. This information is not intended as a substitute for professional medical care. Always follow your healthcare professional's instructions.

## 2019-09-26 ENCOUNTER — OFFICE VISIT (OUTPATIENT)
Dept: URGENT CARE | Facility: URGENT CARE | Age: 59
End: 2019-09-26
Payer: COMMERCIAL

## 2019-09-26 VITALS
RESPIRATION RATE: 16 BRPM | BODY MASS INDEX: 24.44 KG/M2 | TEMPERATURE: 99 F | SYSTOLIC BLOOD PRESSURE: 125 MMHG | DIASTOLIC BLOOD PRESSURE: 80 MMHG | OXYGEN SATURATION: 100 % | HEART RATE: 87 BPM | WEIGHT: 133.6 LBS

## 2019-09-26 DIAGNOSIS — R14.0 GENERALIZED BLOATING: Primary | ICD-10-CM

## 2019-09-26 DIAGNOSIS — R14.1 GAS PAIN: ICD-10-CM

## 2019-09-26 LAB
ALBUMIN UR-MCNC: NEGATIVE MG/DL
APPEARANCE UR: CLEAR
BACTERIA #/AREA URNS HPF: ABNORMAL /HPF
BILIRUB UR QL STRIP: NEGATIVE
COLOR UR AUTO: YELLOW
GLUCOSE UR STRIP-MCNC: NEGATIVE MG/DL
HGB UR QL STRIP: ABNORMAL
KETONES UR STRIP-MCNC: NEGATIVE MG/DL
LEUKOCYTE ESTERASE UR QL STRIP: NEGATIVE
NITRATE UR QL: NEGATIVE
NON-SQ EPI CELLS #/AREA URNS LPF: ABNORMAL /LPF
PH UR STRIP: 7 PH (ref 5–7)
RBC #/AREA URNS AUTO: ABNORMAL /HPF
SOURCE: ABNORMAL
SP GR UR STRIP: 1.01 (ref 1–1.03)
UROBILINOGEN UR STRIP-ACNC: 0.2 EU/DL (ref 0.2–1)
WBC #/AREA URNS AUTO: ABNORMAL /HPF

## 2019-09-26 PROCEDURE — 87086 URINE CULTURE/COLONY COUNT: CPT | Performed by: PHYSICIAN ASSISTANT

## 2019-09-26 PROCEDURE — 81001 URINALYSIS AUTO W/SCOPE: CPT | Performed by: PHYSICIAN ASSISTANT

## 2019-09-26 PROCEDURE — 99214 OFFICE O/P EST MOD 30 MIN: CPT | Performed by: PHYSICIAN ASSISTANT

## 2019-09-26 RX ORDER — VENLAFAXINE HYDROCHLORIDE 37.5 MG/1
CAPSULE, EXTENDED RELEASE ORAL
Refills: 3 | COMMUNITY
Start: 2018-11-19 | End: 2021-08-04 | Stop reason: DRUGHIGH

## 2019-09-26 RX ORDER — SIMETHICONE 125 MG
125 TABLET,CHEWABLE ORAL 4 TIMES DAILY PRN
Qty: 60 TABLET | Refills: 0 | Status: SHIPPED | OUTPATIENT
Start: 2019-09-26 | End: 2021-08-04

## 2019-09-26 RX ORDER — NITROFURANTOIN 25; 75 MG/1; MG/1
100 CAPSULE ORAL 2 TIMES DAILY
Qty: 10 CAPSULE | Refills: 0 | Status: SHIPPED | OUTPATIENT
Start: 2019-09-26 | End: 2019-10-01

## 2019-09-26 ASSESSMENT — ENCOUNTER SYMPTOMS
PALPITATIONS: 0
RESPIRATORY NEGATIVE: 1
FEVER: 0
SHORTNESS OF BREATH: 0
DYSURIA: 0
FREQUENCY: 0
HEMATOCHEZIA: 0
ABDOMINAL PAIN: 0
CHEST TIGHTNESS: 0
FATIGUE: 0
CARDIOVASCULAR NEGATIVE: 1
WHEEZING: 0
VOMITING: 0
DIARRHEA: 0
FLANK PAIN: 0
CHILLS: 0
HEARTBURN: 0
CONSTIPATION: 0
HEMATURIA: 0
NAUSEA: 0
GASTROINTESTINAL NEGATIVE: 1
COUGH: 0

## 2019-09-26 ASSESSMENT — PAIN SCALES - GENERAL: PAINLEVEL: MILD PAIN (2)

## 2019-09-26 NOTE — PROGRESS NOTES
Subjective   Margairta See is a 59 year old female who presents to clinic today for the following health issues:  HPI   Abdominal discomfort    Duration: 2days    Description (location/character/radiation): generalized, bloating, pressure sensation, no radiation       Associated flank pain: None    Intensity:  mild    Accompanying signs and symptoms:        Fever/Chills: no        Gas/Bloating: YES       Nausea/vomitting: no        Diarrhea: No n/v, constipation, diarrhea, bloody or black tarry stools.        Dysuria or Hematuria: No dysuria, urinary frequency, urgency or hematuria.  No vaginal d/c, bleeding, rashes and irritation.  No new partners.     History (previous similar pain/trauma/previous testing): There has been ecoli in the water at work    Precipitating or alleviating factors:       Pain worse with eating/BM/urination: no       Pain relieved by BM: YES    Therapies tried and outcome: None    LMP:  not applicable      Patient Active Problem List   Diagnosis     CARDIOVASCULAR SCREENING; LDL GOAL LESS THAN 160     Allergic rhinitis     Generalized anxiety disorder     Menometrorrhagia     Recurrent sinusitis     Past Surgical History:   Procedure Laterality Date     COLONOSCOPY WITH CO2 INSUFFLATION N/A 7/22/2016    Procedure: COLONOSCOPY WITH CO2 INSUFFLATION;  Surgeon: Duane, William Charles, MD;  Location: [facility] OR     No surgical history         Social History     Tobacco Use     Smoking status: Never Smoker     Smokeless tobacco: Never Used   Substance Use Topics     Alcohol use: Yes     Comment: occasional     Family History   Problem Relation Age of Onset     Diabetes Mother      Diabetes Maternal Grandmother      Breast Cancer Paternal Aunt          Current Outpatient Medications   Medication Sig Dispense Refill     Flaxseed, Linseed, (FLAXSEED OIL PO)        venlafaxine (EFFEXOR-XR) 37.5 MG 24 hr capsule TAKE 1 CAPSULE BY MOUTH ONCE DAILY  3     CRANBERRY PO        phenazopyridine (PYRIDIUM) 200  MG tablet Take 1 tablet (200 mg) by mouth 3 times daily as needed for irritation (Patient not taking: Reported on 3/20/2019) 9 tablet 1     Probiotic Product (PROBIOTIC PO)        Pseudoephedrine-Guaifenesin (MUCINEX D PO)        Allergies   Allergen Reactions     Spinach Nausea and Vomiting     Reviewed and updated as needed this visit by Provider         Review of Systems   Constitutional: Negative for chills, fatigue and fever.   Respiratory: Negative.  Negative for cough, chest tightness, shortness of breath and wheezing.    Cardiovascular: Negative.  Negative for chest pain, palpitations and peripheral edema.   Gastrointestinal: Negative.  Negative for abdominal pain, constipation, diarrhea, heartburn, hematochezia, nausea and vomiting.   Genitourinary: Negative for dysuria, flank pain, frequency, hematuria, pelvic pain, urgency, vaginal bleeding and vaginal discharge.   All other systems reviewed and are negative.           Objective    /80 (BP Location: Left arm, Patient Position: Sitting, Cuff Size: Adult Regular)   Pulse 87   Temp 99  F (37.2  C) (Oral)   Resp 16   Wt 60.6 kg (133 lb 9.6 oz)   LMP 12/10/2010   SpO2 100%   BMI 24.44 kg/m    Body mass index is 24.44 kg/m .  Physical Exam  Vitals signs and nursing note reviewed.   Constitutional:       General: She is not in acute distress.     Appearance: Normal appearance. She is well-developed.   Cardiovascular:      Rate and Rhythm: Normal rate and regular rhythm.      Pulses: Normal pulses.      Heart sounds: Normal heart sounds, S1 normal and S2 normal. No murmur. No friction rub. No gallop.    Pulmonary:      Effort: Pulmonary effort is normal. No accessory muscle usage or respiratory distress.      Breath sounds: Normal breath sounds. No decreased breath sounds, wheezing, rhonchi or rales.   Abdominal:      General: Abdomen is flat. Bowel sounds are normal.      Palpations: Abdomen is soft. There is no hepatomegaly, splenomegaly or mass.       Tenderness: There is no tenderness. There is no right CVA tenderness, left CVA tenderness, guarding or rebound. Negative signs include Dillard's sign, Rovsing's sign, McBurney's sign, psoas sign and obturator sign.      Hernia: No hernia is present.   Genitourinary:     Comments: Declined pelvic exam.  Skin:     General: Skin is warm and dry.   Neurological:      Mental Status: She is alert and oriented to person, place, and time.   Psychiatric:         Mood and Affect: Mood normal.         Behavior: Behavior normal.         Thought Content: Thought content normal.         Judgement: Judgment normal.     Diagnostic Test Results:  Labs reviewed in Epic  Results for orders placed or performed in visit on 09/26/19 (from the past 24 hour(s))   *UA reflex to Microscopic and Culture ([place] and [facility] (except [place] and [place])   Result Value Ref Range    Color Urine Yellow     Appearance Urine Clear     Glucose Urine Negative NEG^Negative mg/dL    Bilirubin Urine Negative NEG^Negative    Ketones Urine Negative NEG^Negative mg/dL    Specific Gravity Urine 1.010 1.003 - 1.035    Blood Urine Trace (A) NEG^Negative    pH Urine 7.0 5.0 - 7.0 pH    Protein Albumin Urine Negative NEG^Negative mg/dL    Urobilinogen Urine 0.2 0.2 - 1.0 EU/dL    Nitrite Urine Negative NEG^Negative    Leukocyte Esterase Urine Negative NEG^Negative    Source Midstream Urine    Urine Microscopic   Result Value Ref Range    WBC Urine 0 - 5 OTO5^0 - 5 /HPF    RBC Urine O - 2 OTO2^O - 2 /HPF    Squamous Epithelial /LPF Urine Few FEW^Few /LPF    Bacteria Urine Few (A) NEG^Negative /HPF           Assessment & Plan   Generalized bloating:  UA showed trace blood, will send for urine culture.  This sounds more like gas and bloating than urinary.  Will give macrobid X5days for patient to have on hand just in case as she will be out of town.  Recommend increase fluids, regular voids, proper wiping techniques and voiding after intercourse.   Educated patient on warning signs of kidney infection and to go to the ER if she develops any of these symptoms.  Recheck in clinic if symptoms worsen or if symptoms do not improve.  -     *UA reflex to Microscopic and Culture ([place] and [facility] (except [place] and [place])  -     Urine Microscopic  -     Urine Culture Aerobic Bacterial  -     nitroFURantoin macrocrystal-monohydrate (MACROBID) 100 MG capsule; Take 1 capsule (100 mg) by mouth 2 times daily for 5 days      Gas pain:  No fevers, n/v or blood in her stools.  Will give simethicone as needed for gas/bloating.  Avoid vegetables.  To the ER if worsening pain, fevers, n/v or blood in her stools or urine.  -     simethicone (MYLICON) 125 MG chewable tablet; Take 1 tablet (125 mg) by mouth 4 times daily as needed for intestinal gas (after meals and at bedtime)        Sheyla Theodore PA-C  [facility]

## 2019-09-27 LAB
BACTERIA SPEC CULT: NORMAL
SPECIMEN SOURCE: NORMAL

## 2019-09-28 ENCOUNTER — NURSE TRIAGE (OUTPATIENT)
Dept: NURSING | Facility: CLINIC | Age: 59
End: 2019-09-28

## 2019-09-28 NOTE — TELEPHONE ENCOUNTER
Caller returning  VM message  regarding lab test   Review of EMR reveals;    Order Providers     Authorizing Provider Encounter Provider   Sheyla Theodore PA-C Thao, Mai See, PA-C   Patient Result Comments     Written by My Plunkett PA-C on 9/27/2019  2:17 PM   Your urine culture was negative.  Finish the antibiotic only if you feel it is helping and follow up with your Primary Care Provider if no resolution of symptoms.     My Plunkett PA-C   ------------------  Read to patient verbatim; understands and will comply   no questions   Jamila Leavitt RN  FNA

## 2019-12-09 ENCOUNTER — HEALTH MAINTENANCE LETTER (OUTPATIENT)
Age: 59
End: 2019-12-09

## 2020-10-12 ENCOUNTER — TRANSFERRED RECORDS (OUTPATIENT)
Dept: HEALTH INFORMATION MANAGEMENT | Facility: CLINIC | Age: 60
End: 2020-10-12

## 2020-10-12 LAB — PAP-ABSTRACT: NORMAL

## 2020-10-15 ENCOUNTER — TRANSFERRED RECORDS (OUTPATIENT)
Dept: HEALTH INFORMATION MANAGEMENT | Facility: CLINIC | Age: 60
End: 2020-10-15

## 2020-10-26 ENCOUNTER — OFFICE VISIT (OUTPATIENT)
Dept: FAMILY MEDICINE | Facility: CLINIC | Age: 60
End: 2020-10-26
Payer: COMMERCIAL

## 2020-10-26 VITALS
OXYGEN SATURATION: 99 % | DIASTOLIC BLOOD PRESSURE: 78 MMHG | SYSTOLIC BLOOD PRESSURE: 124 MMHG | HEART RATE: 82 BPM | WEIGHT: 143.5 LBS | BODY MASS INDEX: 27.09 KG/M2 | RESPIRATION RATE: 16 BRPM | HEIGHT: 61 IN | TEMPERATURE: 97.6 F

## 2020-10-26 DIAGNOSIS — R30.0 DYSURIA: Primary | ICD-10-CM

## 2020-10-26 LAB
ALBUMIN UR-MCNC: NEGATIVE MG/DL
APPEARANCE UR: CLEAR
BACTERIA #/AREA URNS HPF: ABNORMAL /HPF
BILIRUB UR QL STRIP: NEGATIVE
COLOR UR AUTO: YELLOW
GLUCOSE UR STRIP-MCNC: NEGATIVE MG/DL
HGB UR QL STRIP: ABNORMAL
KETONES UR STRIP-MCNC: NEGATIVE MG/DL
LEUKOCYTE ESTERASE UR QL STRIP: ABNORMAL
NITRATE UR QL: NEGATIVE
NON-SQ EPI CELLS #/AREA URNS LPF: ABNORMAL /LPF
PH UR STRIP: 6.5 PH (ref 5–7)
RBC #/AREA URNS AUTO: ABNORMAL /HPF
SOURCE: ABNORMAL
SP GR UR STRIP: 1.02 (ref 1–1.03)
UROBILINOGEN UR STRIP-ACNC: 0.2 EU/DL (ref 0.2–1)
WBC #/AREA URNS AUTO: ABNORMAL /HPF

## 2020-10-26 PROCEDURE — 81001 URINALYSIS AUTO W/SCOPE: CPT | Performed by: PHYSICIAN ASSISTANT

## 2020-10-26 PROCEDURE — 87086 URINE CULTURE/COLONY COUNT: CPT | Performed by: PHYSICIAN ASSISTANT

## 2020-10-26 PROCEDURE — 99213 OFFICE O/P EST LOW 20 MIN: CPT | Performed by: PHYSICIAN ASSISTANT

## 2020-10-26 RX ORDER — NITROFURANTOIN 25; 75 MG/1; MG/1
100 CAPSULE ORAL 2 TIMES DAILY
Qty: 14 CAPSULE | Refills: 0 | Status: SHIPPED | OUTPATIENT
Start: 2020-10-26 | End: 2021-08-04

## 2020-10-26 ASSESSMENT — MIFFLIN-ST. JEOR: SCORE: 1163.29

## 2020-10-26 NOTE — PATIENT INSTRUCTIONS
Take antibiotics as directed    Increase your fluid intake    Urine was sent for culture    Follow up - call or return for recheck if you have new fevers, pain in the back over the kidneys, vomiting, or worsening symptoms

## 2020-10-26 NOTE — PROGRESS NOTES
"Subjective     Margarita See is a 60 year old female who presents to clinic today for the following health issues:    HPI         Genitourinary - Female  Onset/Duration: yesterday morning.   Has been holding urine lately due to work factors and not drinking much water. Then yesterday noticing more frequency, urgency, voiding \"all day\"- every hour. Some spasm feeling low suprapubic.   Today no BM but they have been normal, normal last night, no bleeding.   No vaginal bleeding. No vaginal discharge. No new partners.     Description:   Painful urination (Dysuria): no           Frequency: YES  Blood in urine (Hematuria): no  Delay in urine (Hesitency): no  Intensity: moderate  Progression of Symptoms:  same  Accompanying Signs & Symptoms:  Fever/chills: no  Flank pain: no  Nausea and vomiting: no  Vaginal symptoms: itching - irritating.   Abdominal/Pelvic Pain: YES  History:   History of frequent UTI s: YES  History of kidney stones: no  Sexually Active: no  Possibility of pregnancy: No  Precipitating or alleviating factors: None  Therapies tried and outcome: Cranberry juice prn (contraindicated in Coumadin patients) and Increase fluid intake      Review of Systems   Constitutional, HEENT, cardiovascular, pulmonary, gi and gu systems are negative, except as otherwise noted.      Objective    /78 (BP Location: Left arm, Patient Position: Chair, Cuff Size: Adult Regular)   Pulse 82   Temp 97.6  F (36.4  C) (Temporal)   Resp 16   Ht 1.549 m (5' 1\")   Wt 65.1 kg (143 lb 8 oz)   LMP 12/10/2010   SpO2 99%   Breastfeeding No   BMI 27.11 kg/m    Body mass index is 27.11 kg/m .  Physical Exam   GENERAL: healthy, alert and no distress  EYES: Eyes grossly normal to inspection, PERRL and conjunctivae and sclerae normal  NECK: no adenopathy, no asymmetry, masses, or scars and thyroid normal to palpation  RESP: lungs clear to auscultation - no rales, rhonchi or wheezes  CV: regular rate and rhythm, normal S1 S2, no S3 " or S4, no murmur, click or rub, no peripheral edema and peripheral pulses strong  ABDOMEN: soft, minimal suprapubic tender, no guarding or rebound, no masses and bowel sounds normal  MS: no gross musculoskeletal defects noted, no edema  BACK: no CVA tenderness, no paralumbar tenderness  PSYCH: mentation appears normal, affect normal/bright    Results for orders placed or performed in visit on 10/26/20 (from the past 24 hour(s))   *UA reflex to Microscopic and Culture ([facility] (except [place] and [place])    Specimen: Midstream Urine   Result Value Ref Range    Color Urine Yellow     Appearance Urine Clear     Glucose Urine Negative NEG^Negative mg/dL    Bilirubin Urine Negative NEG^Negative    Ketones Urine Negative NEG^Negative mg/dL    Specific Gravity Urine 1.025 1.003 - 1.035    Blood Urine Trace (A) NEG^Negative    pH Urine 6.5 5.0 - 7.0 pH    Protein Albumin Urine Negative NEG^Negative mg/dL    Urobilinogen Urine 0.2 0.2 - 1.0 EU/dL    Nitrite Urine Negative NEG^Negative    Leukocyte Esterase Urine Small (A) NEG^Negative    Source Midstream Urine    Urine Microscopic   Result Value Ref Range    WBC Urine 0 - 5 OTO5^0 - 5 /HPF    RBC Urine 2-5 (A) OTO2^O - 2 /HPF    Squamous Epithelial /LPF Urine Few FEW^Few /LPF    Bacteria Urine Few (A) NEG^Negative /HPF   Urine Culture Aerobic Bacterial    Specimen: Midstream Urine   Result Value Ref Range    Specimen Description Midstream Urine     Special Requests Specimen received in preservative     Culture Micro Culture negative monitoring continues            Assessment & Plan     Dysuria  Start antibiotic as below- discussed possible side effects  Urine sent for culture  Push fluids  Discussed s/sx for follow up if sx are worsening or not improving as expected.   - *UA reflex to Microscopic and Culture ([facility] (except [place] and [place])  - Urine Microscopic  - nitroFURantoin macrocrystal-monohydrate (MACROBID) 100 MG  "capsule; Take 1 capsule (100 mg) by mouth 2 times daily  - Urine Culture Aerobic Bacterial     BMI:   Estimated body mass index is 27.11 kg/m  as calculated from the following:    Height as of this encounter: 1.549 m (5' 1\").    Weight as of this encounter: 65.1 kg (143 lb 8 oz).   Weight management plan: Patient was referred to their PCP to discuss a diet and exercise plan.         See Patient Instructions    Return in about 3 days (around 10/29/2020) for Recheck if needed for non-improvement.    LENNOX [facility] TOM    "

## 2020-10-27 LAB
BACTERIA SPEC CULT: NORMAL
Lab: NORMAL
SPECIMEN SOURCE: NORMAL

## 2020-10-29 ENCOUNTER — TELEPHONE (OUTPATIENT)
Dept: FAMILY MEDICINE | Facility: CLINIC | Age: 60
End: 2020-10-29

## 2020-10-29 NOTE — TELEPHONE ENCOUNTER
Lenore MAJOR (R) contacted Margarita on 10/29/20 and left a message. If patient calls back please inform patient of results below, thanks    Please contact pt with the following message:     Her urine culture was negative for infection. If the antibiotic has been helpful and she is feeling better she can finish the course. If she is not feeling better, then she should follow up for repeat evaluation.   Berta Umanzor PA-C

## 2020-10-30 NOTE — TELEPHONE ENCOUNTER
Patient called back today, reviewed the notes below.  Patient stated that she felt a lot better. Thank you

## 2020-12-08 ENCOUNTER — ANCILLARY PROCEDURE (OUTPATIENT)
Dept: GENERAL RADIOLOGY | Facility: CLINIC | Age: 60
End: 2020-12-08
Attending: NURSE PRACTITIONER
Payer: COMMERCIAL

## 2020-12-08 ENCOUNTER — OFFICE VISIT (OUTPATIENT)
Dept: URGENT CARE | Facility: URGENT CARE | Age: 60
End: 2020-12-08
Payer: COMMERCIAL

## 2020-12-08 VITALS
DIASTOLIC BLOOD PRESSURE: 72 MMHG | OXYGEN SATURATION: 100 % | HEART RATE: 71 BPM | TEMPERATURE: 97.2 F | BODY MASS INDEX: 27.17 KG/M2 | WEIGHT: 143.8 LBS | SYSTOLIC BLOOD PRESSURE: 128 MMHG | RESPIRATION RATE: 10 BRPM

## 2020-12-08 DIAGNOSIS — R07.89 CHEST TIGHTNESS: Primary | ICD-10-CM

## 2020-12-08 DIAGNOSIS — F41.9 ANXIETY: ICD-10-CM

## 2020-12-08 PROCEDURE — 99214 OFFICE O/P EST MOD 30 MIN: CPT | Performed by: NURSE PRACTITIONER

## 2020-12-08 PROCEDURE — 71046 X-RAY EXAM CHEST 2 VIEWS: CPT | Performed by: RADIOLOGY

## 2020-12-08 PROCEDURE — 93000 ELECTROCARDIOGRAM COMPLETE: CPT | Performed by: NURSE PRACTITIONER

## 2020-12-08 ASSESSMENT — ENCOUNTER SYMPTOMS
RHINORRHEA: 0
COUGH: 0
SORE THROAT: 0
HEADACHES: 0
NAUSEA: 0
VOMITING: 0
CHEST TIGHTNESS: 1
SHORTNESS OF BREATH: 0
DIARRHEA: 0
FEVER: 0
CHILLS: 0

## 2020-12-08 NOTE — PATIENT INSTRUCTIONS
Patient Education     Your Body s Response to Anxiety    Normal anxiety is part of the body s natural defense system. It's an alert to a threat that is unknown, vague, or comes from your own internal fears. While you re in this state, your feelings can range from a vague sense of worry to physical sensations such as a pounding heartbeat. These feelings make you want to react to the threat. An anxiety response is normal in many situations. But when you have an anxiety disorder, the same response can occur at the wrong times.  Anxiety can be helpful  Normal anxiety is a signal from your brain that warns you of a threat and is a normal response to help you prevent something or decrease the bad effects of something you can't control. For example, anxiety is a normal response to situations that might damage your body, separate you from a loved one, or lose your job. The symptoms of anxiety can be physical and mental.  How does it feel?  At certain times, people with anxiety may have:    Dizziness    Muscle tension or pain    Restlessness    Sleeplessness    Trouble concentrating    Racing heartbeat    Fast breathing    Shaking or trembling    Stomachache    Diarrhea    Loss of energy    Sweating    Cold, clammy hands    Chest pain    Dry mouth  Anxiety can also be a problem  Anxiety can become a problem when it is hard to control, occurs for months, and interferes with important parts of your life. With an anxiety disorder, your body has the response described above, but in inappropriate ways. The response a person has depends on the anxiety disorder he or she has. With some disorders, the anxiety is way out of proportion to the threat that triggers it. With others, anxiety may occur even when there isn t a clear threat or trigger.  Who does it affect?  Some people are more prone to persistent anxiety than others. It tends to run in families, and it affects more younger people than older people, and more women than  "men. But no age, race, or gender is immune to anxiety problems.  Anxiety can be treated  The good news is that the anxiety that s disrupting your life can be treated. Check with your healthcare provider and rule out any physical problems that may be causing the anxiety symptoms. If an anxiety disorder is diagnosed seek mental healthcare. This is an illness and it can respond to treatment. Most types of anxiety disorders will respond to \"talk therapy\" and medicines. Working with your doctor or other healthcare provider, you can develop skills to help you cope with anxiety. You can also gain the perspective you need to overcome your fears. Note: Good sources of support or guidance can be found at your local hospital, mental health clinic, or an employee assistance program.  How to cope with anxiety  If anxiety is wearing you down, here are some things you can do to cope:    Keep in mind that you can t control everything about a situation. Change what you can and let the rest take its course.    Exercise--it s a great way to relieve tension and help your body feel relaxed.    Avoid caffeine and nicotine, which can make anxiety symptoms worse.    Fight the temptation to turn to alcohol or unprescribed drugs for relief. They only make things worse in the long run.    Educate yourself about anxiety disorders. Keep track of helpful online resources and books you can use during stressful periods.    Try stress management techniques such as meditation.    Consider online or in-person support groups.   [employer] last reviewed this educational content on 1/1/2017 2000-2020 [employer]. [address] [place] [address], [place] [address]. All rights reserved. This information is not intended as a substitute for professional medical care. Always follow your healthcare professional's instructions.           "

## 2020-12-08 NOTE — PROGRESS NOTES
SUBJECTIVE:   Margarita See is a 60 year old female presenting with a chief complaint of   Chief Complaint   Patient presents with     Tightness in chest     Pt felt a tightness in her chest/throat this morning while at work. Is feeling better now        She is an established patient of [facility].    Chest tightness    Onset of symptoms was today. 1 hour ago while at work. Has been listening to the news on TV lately and is giving her anxiety and makes her upset.   Course of illness is improving.    Severity moderate  Current and Associated symptoms: chest tightness and up the throat  Denies: chest pain, fever, sore throat, headache, SOB, abdominal pain, fever and chills  Treatment measures tried include None tried.  Predisposing factors include None.  No history of heart disease in the past. Has had anxiety in the past, and using Effexor on and off.         Review of Systems   Constitutional: Negative for chills and fever.   HENT: Negative for congestion, ear pain, rhinorrhea and sore throat.    Respiratory: Positive for chest tightness. Negative for cough and shortness of breath.    Gastrointestinal: Negative for diarrhea, nausea and vomiting.   Neurological: Negative for headaches.   All other systems reviewed and are negative.      Past Medical History:   Diagnosis Date     Allergic rhinitis 11/23/2010     History of asthma      Menorrhagia      Family History   Problem Relation Age of Onset     Diabetes Mother      Diabetes Maternal Grandmother      Breast Cancer Paternal Aunt      Current Outpatient Medications   Medication Sig Dispense Refill     CRANBERRY PO        Flaxseed, Linseed, (FLAXSEED OIL PO)        venlafaxine (EFFEXOR-XR) 37.5 MG 24 hr capsule TAKE 1 CAPSULE BY MOUTH ONCE DAILY  3     nitroFURantoin macrocrystal-monohydrate (MACROBID) 100 MG capsule Take 1 capsule (100 mg) by mouth 2 times daily (Patient not taking: Reported on 12/8/2020) 14 capsule 0     Probiotic Product (PROBIOTIC PO)         Pseudoephedrine-Guaifenesin (MUCINEX D PO)        simethicone (MYLICON) 125 MG chewable tablet Take 1 tablet (125 mg) by mouth 4 times daily as needed for intestinal gas (after meals and at bedtime) (Patient not taking: Reported on 12/8/2020) 60 tablet 0     Social History     Tobacco Use     Smoking status: Never Smoker     Smokeless tobacco: Never Used   Substance Use Topics     Alcohol use: Yes     Comment: occasional       OBJECTIVE  /72   Pulse 71   Temp 97.2  F (36.2  C) (Tympanic)   Resp 10   Wt 65.2 kg (143 lb 12.8 oz)   LMP 12/10/2010   SpO2 100%   BMI 27.17 kg/m      Physical Exam  Vitals signs reviewed.   Constitutional:       General: She is not in acute distress.     Appearance: She is well-developed. She is not diaphoretic.   HENT:      Head: Normocephalic and atraumatic.      Right Ear: External ear normal.      Left Ear: External ear normal.      Nose: Nose normal.      Mouth/Throat:      Mouth: Mucous membranes are moist.   Eyes:      Pupils: Pupils are equal, round, and reactive to light.   Neck:      Musculoskeletal: Normal range of motion and neck supple.   Cardiovascular:      Rate and Rhythm: Normal rate.   Pulmonary:      Effort: Pulmonary effort is normal. No respiratory distress.      Breath sounds: Normal breath sounds.   Lymphadenopathy:      Cervical: No cervical adenopathy.   Skin:     General: Skin is warm and dry.   Neurological:      General: No focal deficit present.      Mental Status: She is alert.      Cranial Nerves: No cranial nerve deficit.   Psychiatric:         Mood and Affect: Mood normal.         Labs:  Results for orders placed or performed in visit on 12/08/20 (from the past 24 hour(s))   XR Chest 2 Views    Narrative    CHEST TWO VIEWS December 8, 2020 11:36 AM     HISTORY: 60-year-old woman with chest tightness one hour prior.    COMPARISON: None.       Impression    IMPRESSION: Heart size is normal. No pleural effusion, pneumothorax,  or abnormal area of  consolidation.    JOÃO JERNIGAN MD       X-Ray was done, my findings are: normal chest xray    ASSESSMENT:      ICD-10-CM    1. Chest tightness  R07.89 EKG 12-lead complete w/read - Clinics     XR Chest 2 Views   2. Anxiety  F41.9       Differential Diagnosis:  Cardiac: Acute MI  Chest wall Pain  Pleurisy  PE  Panic/Anxiety      PLAN:  Chest xray and EKG are unremarkable  I discussed lab results with the patient.  I have advised to follow up with PCP in the next 2-3 days  Patient educational/instructional material provided including reasons for follow-up    The patient indicates understanding of these issues and agrees with the plan.             Patient Instructions       Patient Education     Your Body s Response to Anxiety    Normal anxiety is part of the body s natural defense system. It's an alert to a threat that is unknown, vague, or comes from your own internal fears. While you re in this state, your feelings can range from a vague sense of worry to physical sensations such as a pounding heartbeat. These feelings make you want to react to the threat. An anxiety response is normal in many situations. But when you have an anxiety disorder, the same response can occur at the wrong times.  Anxiety can be helpful  Normal anxiety is a signal from your brain that warns you of a threat and is a normal response to help you prevent something or decrease the bad effects of something you can't control. For example, anxiety is a normal response to situations that might damage your body, separate you from a loved one, or lose your job. The symptoms of anxiety can be physical and mental.  How does it feel?  At certain times, people with anxiety may have:    Dizziness    Muscle tension or pain    Restlessness    Sleeplessness    Trouble concentrating    Racing heartbeat    Fast breathing    Shaking or trembling    Stomachache    Diarrhea    Loss of energy    Sweating    Cold, clammy hands    Chest pain    Dry  "mouth  Anxiety can also be a problem  Anxiety can become a problem when it is hard to control, occurs for months, and interferes with important parts of your life. With an anxiety disorder, your body has the response described above, but in inappropriate ways. The response a person has depends on the anxiety disorder he or she has. With some disorders, the anxiety is way out of proportion to the threat that triggers it. With others, anxiety may occur even when there isn t a clear threat or trigger.  Who does it affect?  Some people are more prone to persistent anxiety than others. It tends to run in families, and it affects more younger people than older people, and more women than men. But no age, race, or gender is immune to anxiety problems.  Anxiety can be treated  The good news is that the anxiety that s disrupting your life can be treated. Check with your healthcare provider and rule out any physical problems that may be causing the anxiety symptoms. If an anxiety disorder is diagnosed seek mental healthcare. This is an illness and it can respond to treatment. Most types of anxiety disorders will respond to \"talk therapy\" and medicines. Working with your doctor or other healthcare provider, you can develop skills to help you cope with anxiety. You can also gain the perspective you need to overcome your fears. Note: Good sources of support or guidance can be found at your local hospital, mental health clinic, or an employee assistance program.  How to cope with anxiety  If anxiety is wearing you down, here are some things you can do to cope:    Keep in mind that you can t control everything about a situation. Change what you can and let the rest take its course.    Exercise--it s a great way to relieve tension and help your body feel relaxed.    Avoid caffeine and nicotine, which can make anxiety symptoms worse.    Fight the temptation to turn to alcohol or unprescribed drugs for relief. They only make things " worse in the long run.    Educate yourself about anxiety disorders. Keep track of helpful online resources and books you can use during stressful periods.    Try stress management techniques such as meditation.    Consider online or in-person support groups.   [employer] last reviewed this educational content on 1/1/2017 2000-2020 [employer]. [address] [place] [address], [place] [address]. All rights reserved. This information is not intended as a substitute for professional medical care. Always follow your healthcare professional's instructions.

## 2021-01-14 ENCOUNTER — HEALTH MAINTENANCE LETTER (OUTPATIENT)
Age: 61
End: 2021-01-14

## 2021-02-16 ENCOUNTER — ANCILLARY PROCEDURE (OUTPATIENT)
Dept: MAMMOGRAPHY | Facility: CLINIC | Age: 61
End: 2021-02-16
Attending: FAMILY MEDICINE
Payer: COMMERCIAL

## 2021-02-16 DIAGNOSIS — Z12.31 VISIT FOR SCREENING MAMMOGRAM: ICD-10-CM

## 2021-02-16 PROCEDURE — 77063 BREAST TOMOSYNTHESIS BI: CPT | Mod: TC | Performed by: RADIOLOGY

## 2021-02-16 PROCEDURE — 77067 SCR MAMMO BI INCL CAD: CPT | Mod: TC | Performed by: RADIOLOGY

## 2021-05-24 ENCOUNTER — TELEPHONE (OUTPATIENT)
Dept: FAMILY MEDICINE | Facility: CLINIC | Age: 61
End: 2021-05-24

## 2021-05-24 NOTE — TELEPHONE ENCOUNTER
Patient Quality Outreach Summary      Summary:    Patient is due/failing the following:   Cervical Cancer Screening - PAP Needed and Physical with fasting labs    Type of outreach:    Sent [employer] message.    Questions for provider review:    None                                                                                                                    Dahiana Anand CMA       Chart routed to Care Team.

## 2021-07-01 ENCOUNTER — TRANSFERRED RECORDS (OUTPATIENT)
Dept: HEALTH INFORMATION MANAGEMENT | Facility: CLINIC | Age: 61
End: 2021-07-01

## 2021-08-04 ENCOUNTER — TELEPHONE (OUTPATIENT)
Dept: FAMILY MEDICINE | Facility: CLINIC | Age: 61
End: 2021-08-04

## 2021-08-04 ENCOUNTER — OFFICE VISIT (OUTPATIENT)
Dept: FAMILY MEDICINE | Facility: CLINIC | Age: 61
End: 2021-08-04
Payer: COMMERCIAL

## 2021-08-04 VITALS
RESPIRATION RATE: 16 BRPM | SYSTOLIC BLOOD PRESSURE: 124 MMHG | TEMPERATURE: 97.1 F | HEART RATE: 90 BPM | OXYGEN SATURATION: 100 % | HEIGHT: 61 IN | DIASTOLIC BLOOD PRESSURE: 80 MMHG | WEIGHT: 146 LBS | BODY MASS INDEX: 27.56 KG/M2

## 2021-08-04 DIAGNOSIS — F41.9 ANXIETY: Primary | ICD-10-CM

## 2021-08-04 LAB
ANION GAP SERPL CALCULATED.3IONS-SCNC: 2 MMOL/L (ref 3–14)
BUN SERPL-MCNC: 15 MG/DL (ref 7–30)
CALCIUM SERPL-MCNC: 9.1 MG/DL (ref 8.5–10.1)
CHLORIDE BLD-SCNC: 102 MMOL/L (ref 94–109)
CO2 SERPL-SCNC: 30 MMOL/L (ref 20–32)
CREAT SERPL-MCNC: 0.75 MG/DL (ref 0.52–1.04)
ERYTHROCYTE [DISTWIDTH] IN BLOOD BY AUTOMATED COUNT: 12.9 % (ref 10–15)
GFR SERPL CREATININE-BSD FRML MDRD: 87 ML/MIN/1.73M2
GLUCOSE BLD-MCNC: 93 MG/DL (ref 70–99)
HCT VFR BLD AUTO: 38.3 % (ref 35–47)
HGB BLD-MCNC: 12.7 G/DL (ref 11.7–15.7)
MCH RBC QN AUTO: 30.4 PG (ref 26.5–33)
MCHC RBC AUTO-ENTMCNC: 33.2 G/DL (ref 31.5–36.5)
MCV RBC AUTO: 92 FL (ref 78–100)
PLATELET # BLD AUTO: 216 10E3/UL (ref 150–450)
POTASSIUM BLD-SCNC: 4 MMOL/L (ref 3.4–5.3)
RBC # BLD AUTO: 4.18 10E6/UL (ref 3.8–5.2)
SODIUM SERPL-SCNC: 134 MMOL/L (ref 133–144)
TSH SERPL DL<=0.005 MIU/L-ACNC: 1.27 MU/L (ref 0.4–4)
VIT B12 SERPL-MCNC: 553 PG/ML (ref 193–986)
WBC # BLD AUTO: 8 10E3/UL (ref 4–11)

## 2021-08-04 PROCEDURE — 85027 COMPLETE CBC AUTOMATED: CPT | Performed by: PHYSICIAN ASSISTANT

## 2021-08-04 PROCEDURE — 36415 COLL VENOUS BLD VENIPUNCTURE: CPT | Performed by: PHYSICIAN ASSISTANT

## 2021-08-04 PROCEDURE — 99214 OFFICE O/P EST MOD 30 MIN: CPT | Performed by: PHYSICIAN ASSISTANT

## 2021-08-04 PROCEDURE — 84443 ASSAY THYROID STIM HORMONE: CPT | Performed by: PHYSICIAN ASSISTANT

## 2021-08-04 PROCEDURE — 82607 VITAMIN B-12: CPT | Performed by: PHYSICIAN ASSISTANT

## 2021-08-04 PROCEDURE — 80048 BASIC METABOLIC PNL TOTAL CA: CPT | Performed by: PHYSICIAN ASSISTANT

## 2021-08-04 RX ORDER — VENLAFAXINE HYDROCHLORIDE 150 MG/1
150 CAPSULE, EXTENDED RELEASE ORAL 2 TIMES DAILY
COMMUNITY
Start: 2021-07-12 | End: 2021-08-17 | Stop reason: DRUGHIGH

## 2021-08-04 ASSESSMENT — ANXIETY QUESTIONNAIRES
GAD7 TOTAL SCORE: 4
5. BEING SO RESTLESS THAT IT IS HARD TO SIT STILL: NOT AT ALL
4. TROUBLE RELAXING: NOT AT ALL
3. WORRYING TOO MUCH ABOUT DIFFERENT THINGS: SEVERAL DAYS
2. NOT BEING ABLE TO STOP OR CONTROL WORRYING: SEVERAL DAYS
7. FEELING AFRAID AS IF SOMETHING AWFUL MIGHT HAPPEN: NOT AT ALL
7. FEELING AFRAID AS IF SOMETHING AWFUL MIGHT HAPPEN: NOT AT ALL
1. FEELING NERVOUS, ANXIOUS, OR ON EDGE: SEVERAL DAYS
6. BECOMING EASILY ANNOYED OR IRRITABLE: SEVERAL DAYS
GAD7 TOTAL SCORE: 4
GAD7 TOTAL SCORE: 4
8. IF YOU CHECKED OFF ANY PROBLEMS, HOW DIFFICULT HAVE THESE MADE IT FOR YOU TO DO YOUR WORK, TAKE CARE OF THINGS AT HOME, OR GET ALONG WITH OTHER PEOPLE?: SOMEWHAT DIFFICULT

## 2021-08-04 ASSESSMENT — PATIENT HEALTH QUESTIONNAIRE - PHQ9
SUM OF ALL RESPONSES TO PHQ QUESTIONS 1-9: 6
SUM OF ALL RESPONSES TO PHQ QUESTIONS 1-9: 6
10. IF YOU CHECKED OFF ANY PROBLEMS, HOW DIFFICULT HAVE THESE PROBLEMS MADE IT FOR YOU TO DO YOUR WORK, TAKE CARE OF THINGS AT HOME, OR GET ALONG WITH OTHER PEOPLE: VERY DIFFICULT

## 2021-08-04 ASSESSMENT — MIFFLIN-ST. JEOR: SCORE: 1169.63

## 2021-08-04 ASSESSMENT — PAIN SCALES - GENERAL: PAINLEVEL: NO PAIN (0)

## 2021-08-04 NOTE — PATIENT INSTRUCTIONS
Keep doing all your good exercises, healthy eating, yoga.     Please clarify with pharmacist what dose you are on venlafaxine and if short acting or extended release.     Beer - advise only on weekend    Ref for counseling - techniques to help w/relaxation    Lab work today

## 2021-08-04 NOTE — PROGRESS NOTES
"    Assessment & Plan     Anxiety  Pt had recent dose increase of her venlafaxine XR from 150mg to 300mg (by OB/GYN).  Has been helping  Anxiety is only work related, affecting job performance  Discussed working w/supervisor on task specific changes that may be helpful  Ref for therapy  Considering skilled nursing for spring 2022.  Continue yoga, mindfulness, self cares  Limit and reduce alcohol  Will update labs     - CBC with platelets; Future  - TSH with free T4 reflex; Future  - Vitamin B12; Future  - Basic metabolic panel  (Ca, Cl, CO2, Creat, Gluc, K, Na, BUN); Future  - MENTAL HEALTH REFERRAL  - Adult; Outpatient Treatment; Individual/Couples/Family/Group Therapy/Health Psychology; [facility] [phone]; We will contact you to schedule the appointment or please call with any questions; Future  - Basic metabolic panel  (Ca, Cl, CO2, Creat, Gluc, K, Na, BUN)  - Vitamin B12  - TSH with free T4 reflex  - CBC with platelets             BMI:   Estimated body mass index is 27.59 kg/m  as calculated from the following:    Height as of this encounter: 1.549 m (5' 1\").    Weight as of this encounter: 66.2 kg (146 lb).   Weight management plan: Discussed healthy diet and exercise guidelines    Follow Up: The patient was instructed to contact clinic for worsening symptoms, non-improvement in time frame as expected/discussed, and for questions regarding medications or treatment plan. For virtual visits, the patient was advised to be seen for in person evaluation if symptoms or condition are worsening or non-improvement as expected.       Return in about 4 weeks (around 9/1/2021).    Berta Umanzor PA-C  [facility] TOM Avila is a 61 year old who presents for the following health issues  accompanied by her self:    History of Present Illness       Mental Health Follow-up:  Patient presents to follow-up on Anxiety.    Patient's anxiety since last visit has been:  Better  The " "patient is having other symptoms associated with anxiety.  Any significant life events: job concerns  Patient is not feeling anxious or having panic attacks.  Patient has no concerns about alcohol or drug use.     Social History  Tobacco Use    Smoking status: Never Smoker    Smokeless tobacco: Never Used  Alcohol use: Yes    Comment: occasional  Drug use: No      Today's PHQ-9         PHQ-9 Total Score:     (P) 6   PHQ-9 Q9 Thoughts of better off dead/self-harm past 2 weeks :   (P) Not at all   Thoughts of suicide or self harm:      Self-harm Plan:        Self-harm Action:          Safety concerns for self or others:             Anxiety/concentration - work main stressor  Had primary OB/GYN retired.   New OB/GYN recently-  prescribing venlafaxine  She was on venlafaxine XR 75mg BID (for 1 year)- ([facility] OB told her to take the XR BID schedule- the bottle label she has with [facility] daily).  Two weeks ago her dose was increased and she is taking venlafaxine XR 150mg twice daily.  She thinks the dose increase has helped  More relaxed.   Family can tell she has had improvement.   More focus. More concentration.   On the medication \"can handle anything at home\"  No physical sx of anxiety.     The main source of her anxiety and sx is work.  at [employer].   Past year, with pandemic many changes, new employees younger. Feels like can't keep up.   A lot of anxiety at work. Feels like finger pointing, and revisiting past issues. A lot of changes.   Mikhail approached her - they have known her for 20 yrs - came to her about performance issues. Making more mistakes.   She has had issues off and on.   Boss has worked with her on techniques for improvement.    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 Then hurrys and makes mistakes. Can't focus when anxious.   Worrying at work.   [facility] has no sx at home.   Does yoga daily to help calm.   Sleeping- waking some at night to void not " "due to anxiety    Never done counseling.   Has 4 kids, last one in her 30s is moving out. Home life not stressful.     No memory issues otherwise.     Eats normal diet.   Regular exercise - on weekends. Son is a  and helps her. P90x videos 3 days per week   Having 2-3 beers a few nights per week.     Review of Systems   Constitutional, HEENT, cardiovascular, pulmonary, gi and gu systems are negative, except as otherwise noted.      Objective    /80 (BP Location: Left arm, Patient Position: Chair, Cuff Size: Adult Regular)   Pulse 90   Temp 97.1  F (36.2  C) (Temporal)   Resp 16   Ht 1.549 m (5' 1\")   Wt 66.2 kg (146 lb)   LMP 12/10/2010 (Exact Date)   SpO2 100%   Breastfeeding No   BMI 27.59 kg/m    Body mass index is 27.59 kg/m .  Physical Exam   GENERAL: healthy, alert and no distress  EYES: Eyes grossly normal to inspection, PERRL and conjunctivae and sclerae normal  HENT: ear canals and TM's normal, nose and mouth without ulcers or lesions  NECK: no adenopathy, no asymmetry, masses, or scars and thyroid normal to palpation  RESP: lungs clear to auscultation - no rales, rhonchi or wheezes  CV: regular rate and rhythm, normal S1 S2, no S3 or S4, no murmur, click or rub, no peripheral edema and peripheral pulses strong  ABDOMEN: soft, nontender, no hepatosplenomegaly, no masses and bowel sounds normal  MS: no gross musculoskeletal defects noted, no edema  NEURO: Normal strength and tone, mentation intact and speech normal  PSYCH: mentation appears normal, affect normal    Results for orders placed or performed in visit on 08/04/21 (from the past 24 hour(s))   Basic metabolic panel  (Ca, Cl, CO2, Creat, Gluc, K, Na, BUN)   Result Value Ref Range    Sodium 134 133 - 144 mmol/L    Potassium 4.0 3.4 - 5.3 mmol/L    Chloride 102 94 - 109 mmol/L    Carbon Dioxide (CO2) 30 20 - 32 mmol/L    Anion Gap 2 (L) 3 - 14 mmol/L    Urea Nitrogen 15 7 - 30 mg/dL    Creatinine 0.75 0.52 - 1.04 mg/dL    Calcium " 9.1 8.5 - 10.1 mg/dL    Glucose 93 70 - 99 mg/dL    GFR Estimate 87 >60 mL/min/1.73m2   TSH with free T4 reflex   Result Value Ref Range    TSH 1.27 0.40 - 4.00 mU/L   CBC with platelets   Result Value Ref Range    WBC Count 8.0 4.0 - 11.0 10e3/uL    RBC Count 4.18 3.80 - 5.20 10e6/uL    Hemoglobin 12.7 11.7 - 15.7 g/dL    Hematocrit 38.3 35.0 - 47.0 %    MCV 92 78 - 100 fL    MCH 30.4 26.5 - 33.0 pg    MCHC 33.2 31.5 - 36.5 g/dL    RDW 12.9 10.0 - 15.0 %    Platelet Count 216 150 - 450 10e3/uL               Answers for HPI/ROS submitted by the patient on 8/4/2021  If you checked off any problems, how difficult have these problems made it for you to do your work, take care of things at home, or get along with other people?: Very difficult  PHQ9 TOTAL SCORE: 6  GISELA 7 TOTAL SCORE: 4

## 2021-08-04 NOTE — TELEPHONE ENCOUNTER
Reason for Call:  Other returning call    Detailed comments: patient wants provider to know that the venlafzxine is extended release     Phone Number Patient can be reached at: Home number on file [phone] (home)    Best Time: anytime    Can we leave a detailed message on this number? YES    Call taken on 8/4/2021 at 2:43 PM by Nimco Lopez

## 2021-08-05 ENCOUNTER — MYC MEDICAL ADVICE (OUTPATIENT)
Dept: GENERAL RADIOLOGY | Facility: CLINIC | Age: 61
End: 2021-08-05

## 2021-08-05 RX ORDER — VENLAFAXINE HYDROCHLORIDE 150 MG/1
150 CAPSULE, EXTENDED RELEASE ORAL 2 TIMES DAILY
Qty: 2 CAPSULE | Refills: 0 | COMMUNITY
Start: 2021-08-05 | End: 2021-08-17

## 2021-08-05 ASSESSMENT — PATIENT HEALTH QUESTIONNAIRE - PHQ9: SUM OF ALL RESPONSES TO PHQ QUESTIONS 1-9: 6

## 2021-08-05 ASSESSMENT — ANXIETY QUESTIONNAIRES: GAD7 TOTAL SCORE: 4

## 2021-08-06 NOTE — TELEPHONE ENCOUNTER
Pt confirmed with her pharmacist - she has been taking venlafaxine XR 150mg BID dosing (rx from her OB/GYN)  Added as historical medication.  Berta Umanzor PA-C

## 2021-08-06 NOTE — TELEPHONE ENCOUNTER
Lenore MAJOR (R) contacted Margarita on 08/06/21 and left a message. If patient calls back please inform patient of results below, thanks    Please contact pt with the following message:    Complete blood counts are normal.      Thyroid is normal.     B12 is normal     Kidney function (serum creatinine and GFR) and electrolyte tests are normal.     Berta Umanzor PA-C

## 2021-08-08 ENCOUNTER — MYC MEDICAL ADVICE (OUTPATIENT)
Dept: FAMILY MEDICINE | Facility: CLINIC | Age: 61
End: 2021-08-08

## 2021-08-17 RX ORDER — VENLAFAXINE HYDROCHLORIDE 150 MG/1
150 CAPSULE, EXTENDED RELEASE ORAL DAILY
Qty: 1 CAPSULE | Refills: 0 | COMMUNITY
Start: 2021-08-17 | End: 2021-09-13

## 2021-08-25 ENCOUNTER — MYC MEDICAL ADVICE (OUTPATIENT)
Dept: FAMILY MEDICINE | Facility: CLINIC | Age: 61
End: 2021-08-25

## 2021-08-30 ENCOUNTER — VIRTUAL VISIT (OUTPATIENT)
Dept: PSYCHOLOGY | Facility: CLINIC | Age: 61
End: 2021-08-30
Payer: COMMERCIAL

## 2021-08-30 DIAGNOSIS — Z53.9 NO SHOW: Primary | ICD-10-CM

## 2021-08-30 DIAGNOSIS — F41.9 ANXIETY: ICD-10-CM

## 2021-08-31 ENCOUNTER — VIRTUAL VISIT (OUTPATIENT)
Dept: PSYCHOLOGY | Facility: CLINIC | Age: 61
End: 2021-08-31
Payer: COMMERCIAL

## 2021-08-31 DIAGNOSIS — F43.22 ADJUSTMENT DISORDER WITH ANXIOUS MOOD: Primary | ICD-10-CM

## 2021-08-31 PROCEDURE — 90791 PSYCH DIAGNOSTIC EVALUATION: CPT | Mod: 95 | Performed by: COUNSELOR

## 2021-08-31 ASSESSMENT — ANXIETY QUESTIONNAIRES
5. BEING SO RESTLESS THAT IT IS HARD TO SIT STILL: NOT AT ALL
GAD7 TOTAL SCORE: 4
4. TROUBLE RELAXING: NOT AT ALL
3. WORRYING TOO MUCH ABOUT DIFFERENT THINGS: NOT AT ALL
2. NOT BEING ABLE TO STOP OR CONTROL WORRYING: SEVERAL DAYS
1. FEELING NERVOUS, ANXIOUS, OR ON EDGE: SEVERAL DAYS
6. BECOMING EASILY ANNOYED OR IRRITABLE: SEVERAL DAYS
7. FEELING AFRAID AS IF SOMETHING AWFUL MIGHT HAPPEN: SEVERAL DAYS

## 2021-08-31 ASSESSMENT — COLUMBIA-SUICIDE SEVERITY RATING SCALE - C-SSRS
ATTEMPT LIFETIME: NO
1. IN THE PAST MONTH, HAVE YOU WISHED YOU WERE DEAD OR WISHED YOU COULD GO TO SLEEP AND NOT WAKE UP?: NO
5. HAVE YOU STARTED TO WORK OUT OR WORKED OUT THE DETAILS OF HOW TO KILL YOURSELF? DO YOU INTEND TO CARRY OUT THIS PLAN?: NO
6. HAVE YOU EVER DONE ANYTHING, STARTED TO DO ANYTHING, OR PREPARED TO DO ANYTHING TO END YOUR LIFE?: NO
4. HAVE YOU HAD THESE THOUGHTS AND HAD SOME INTENTION OF ACTING ON THEM?: NO
4. HAVE YOU HAD THESE THOUGHTS AND HAD SOME INTENTION OF ACTING ON THEM?: NO
TOTAL  NUMBER OF ABORTED OR SELF INTERRUPTED ATTEMPTS PAST LIFETIME: NO
2. HAVE YOU ACTUALLY HAD ANY THOUGHTS OF KILLING YOURSELF?: NO
TOTAL  NUMBER OF ABORTED OR SELF INTERRUPTED ATTEMPTS PAST 3 MONTHS: NO
TOTAL  NUMBER OF INTERRUPTED ATTEMPTS PAST 3 MONTHS: NO
1. IN THE PAST MONTH, HAVE YOU WISHED YOU WERE DEAD OR WISHED YOU COULD GO TO SLEEP AND NOT WAKE UP?: NO
6. HAVE YOU EVER DONE ANYTHING, STARTED TO DO ANYTHING, OR PREPARED TO DO ANYTHING TO END YOUR LIFE?: NO
TOTAL  NUMBER OF INTERRUPTED ATTEMPTS LIFETIME: NO
3. HAVE YOU BEEN THINKING ABOUT HOW YOU MIGHT KILL YOURSELF?: NO
2. HAVE YOU ACTUALLY HAD ANY THOUGHTS OF KILLING YOURSELF LIFETIME?: NO
ATTEMPT PAST THREE MONTHS: NO
5. HAVE YOU STARTED TO WORK OUT OR WORKED OUT THE DETAILS OF HOW TO KILL YOURSELF? DO YOU INTEND TO CARRY OUT THIS PLAN?: NO

## 2021-08-31 ASSESSMENT — PATIENT HEALTH QUESTIONNAIRE - PHQ9: SUM OF ALL RESPONSES TO PHQ QUESTIONS 1-9: 1

## 2021-08-31 NOTE — PROGRESS NOTES
"[facility]  Provider Name:  Luz Slime    Credentials:  MS, LADC, LPCC    PATIENT'S NAME: Margarita See  PREFERRED NAME:   PRONOUNS:       MRN: [id number]  : 1960  ADDRESS: [address] [place] [address] [place] [address]  ACCT. NUMBER:  [id number]  DATE OF SERVICE: 21  START TIME: 4:30pm  END TIME: 5:20pm  PREFERRED PHONE: [phone]  May we leave a program related message: No   SERVICE MODALITY:  Phone Visit:      Provider verified identity through the following two step process.  Patient provided:  Patient  and Patient address    The patient has been notified of the following:      \"We have found that certain health care needs can be provided without the need for a face to face visit.  This service lets us provide the care you need with a phone conversation.       I will have full access to your [facility] medical record during this entire phone call.   I will be taking notes for your medical record.      Since this is like an office visit, we will bill your insurance company for this service.       There are potential benefits and risks of telephone visits (e.g. limits to patient confidentiality) that differ from in-person visits.?Confidentiality still applies for telephone services, and nobody will record the visit.  It is important to be in a quiet, private space that is free of distractions (including cell phone or other devices) during the visit.??      If during the course of the call I believe a telephone visit is not appropriate, you will not be charged for this service\"     Consent has been obtained for this service by care team member: Yes     UNIVERSAL ADULT Mental Health DIAGNOSTIC ASSESSMENT    Identifying Information:  Patient is a 61 year old,.  The pronoun use throughout this assessment reflects the patient's chosen pronoun.  Patient was referred for an assessment by referring provider.  Patient attended the session alone.     Chief Complaint: " "  The reason for seeking services at this time is: \"anxiety\". Pt states she has experienced an increase in anxiety at work that has been spurred on by COVID.  The problem(s) began 01/01/21.    Patient has attempted to resolve these concerns in the past through reiki, diet changes, and yoga. This is the pt's first time in therapy.    Social/Family History:  Patient reported they grew up in [place].  They were raised by biological parents  .  Parents were always together.  Patient reported that their childhood was as \"great\". Patient described their current relationships with family of origin as having a good relationship with older sister.     The patient describes their cultural background as .  Cultural influences and impact on patient's life structure, values, norms, and healthcare: [place] in [place]  Contextual influences on patient's health include: Individual Factors none reported.  These factors will be addressed in the Preliminary Treatment plan. Patient identified their preferred language to be English. Patient reported they does not need the assistance of an  or other support involved in therapy.     Patient reported had no significant delays in developmental tasks.   Patient's highest education level was associate degree / vocational certificate  .  Patient identified the following learning problems: none reported.  Modifications will not be used to assist communication in therapy.  Patient reports they are  able to understand written materials.    Patient reported the following relationship history \"pretty good\" with .  Patient's current relationship status is  for 19 years.   Patient identified their sexual orientation as heterosexual.  Patient reported having 4 child(melvin). Patient identified her son as her  and part of their support system.  Patient identified the quality of these relationships as inconsistent.      Patient's " current living/housing situation involves staying in own home/apartment. The immediate members of family and household include Young,Spouse and they report that housing is stable.    Patient is currently employed fulltime. Patient reports their finances are obtained through employment. Patient does not identify finances as a current stressor.      Patient reported that they have not been involved with the legal system. Patient does not report being under probation/ parole/ jurisdiction. They are not under any current court jurisdiction. .    Patient's Strengths and Limitations:  Patient identified the following strengths or resources that will help them succeed in treatment: commitment to health and well being and sense of humor. Things that may interfere with the patient's success in treatment include: none identified.     Personal and Family Medical History:  Patient does not report a family history of mental health concerns.  Patient reports family history includes Breast Cancer in her paternal aunt; Diabetes in her maternal grandmother and mother..     Patient does not report Mental Health Diagnosis or Treatment.      Patient has had a physical exam to rule out medical causes for current symptoms.  Date of last physical exam was within the past year. Client was encouraged to follow up with PCP if symptoms were to develop. The patient has a [facility] Primary Care Provider, who is named Berta Umanzor..  Patient reports no current medical concerns.  Patient denies any issues with pain..   There are not significant appetite / nutritional concerns / weight changes.   Patient does not report a history of head injury / trauma / cognitive impairment.     Medication Adherence:  Patient reports taking prescribed medications as prescribed. See chart for list of medications.      Patient Allergies:    Allergies   Allergen Reactions     Other [No Clinical Screening - See Comments]      Something for a yeast infection      Spinach Nausea and Vomiting       Medical History:    Past Medical History:   Diagnosis Date     Allergic rhinitis 11/23/2010     History of asthma      Menorrhagia          Current Mental Status Exam:   Appearance:  phone visit, unable to assess  Eye Contact:  phone visit, unable to assess  Psychomotor:  phone visit, unable to assess       Gait / station:  phone visit, unable to assess  Attitude / Demeanor: Playful Friendly  Speech      Rate / Production: Normal/ Responsive      Volume:  Normal  volume      Language:  intact  Mood:   Anxious   Affect:   Worrisome    Thought Content: Clear   Thought Process: [place]      Associations: No loosening of associations  Insight:   Fair   Judgment:  Intact   Orientation:  All  Attention/concentration: Good    Rating Scales:    PHQ9:    PHQ-9 SCORE 3/6/2012 8/4/2021 8/31/2021   PHQ-9 Total Score 0 - -   PHQ-9 Total Score MyChart - 6 (Mild depression) -   PHQ-9 Total Score - 6 1       GAD7:    GISELA-7 SCORE 8/4/2021   Total Score 4 (minimal anxiety)   Total Score 4     CGI:     First:No data recorded    Most recentNo data recorded    Substance Use:  Patient did report a family history of substance use concerns; see medical history section for details.  Patient has not received chemical dependency treatment in the past.  Patient has not ever been to detox.      Patient is not currently receiving any chemical dependency treatment. Patient reported the following problems as a result of their substance use:  n/a.    Patient denies using alcohol.  Patient denies using tobacco.  Patient denies using cannabis.  Patient reports using caffeine 4 times per day and drinks 1 at a time. Patient started using caffeine at age 20.  Patient reports using/abusing the following substance(s). Patient reported no other substance use.     CAGE- AID:    CAGE-AID Total Score 8/31/2021   Total Score 0       Substance Use: No symptoms    Based on the negative CAGE score and clinical interview  there  are not indications of drug or alcohol abuse.      Significant Losses / Trauma / Abuse / Neglect Issues:   Patient did not serve in the .  There are indications or report of significant loss, trauma, abuse or neglect issues related to: are no indications and client denies any losses, trauma, abuse, or neglect concerns.  Concerns for possible neglect are not present.     Safety Assessment:   Current Safety Concerns:  [place] Suicide Severity Rating Scale (Lifetime/Recent)  [place] Suicide Severity Rating (Lifetime/Recent) 8/31/2021   1. Wish to be Dead (Lifetime) No   1. Wish to be Dead (Recent) No   2. Non-Specific Active Suicidal Thoughts (Lifetime) No   2. Non-Specific Active Suicidal Thoughts (Recent) No   3. Active Suicidal Ideation with any Methods (Not Plan) Without Intent to Act (Lifetime) No   3. Active Suicidal Ideation with any Methods (Not Plan) Without Intent to Act (Recent) No   4. Active Suicidal Ideation with Some Intent to Act, Without Specific Plan (Lifetime) No   4. Active Suicidal Ideation with Some Intent to Act, Without Specific Plan (Recent) No   5. Active Suicidal Ideation with Specific Plan and Intent (Lifetime) No   5. Active Suicidal Ideation with Specific Plan and Intent (Recent) No   Most Severe Ideation Rating (Lifetime) NA   Most Severe Ideation Description (Lifetime) n   Frequency (Lifetime) NA   Duration (Lifetime) NA   Controllability (Lifetime) NA   Protective Factors  (Lifetime) NA   Reasons for Ideation (Lifetime) NA   Most Severe Ideation Rating (Past Month) NA   Most Severe Ideation Description (Past Month) n   Frequency (Past Month) NA   Duration (Past Month) NA   Controllability (Past Month) NA   Protective Factors (Past Month) NA   Actual Attempt (Lifetime) No   Actual Attempt (Past 3 Months) No   Has subject engaged in non-suicidal self-injurious behavior? (Lifetime) No   Has subject engaged in non-suicidal self-injurious behavior? (Past 3 Months) No    Interrupted Attempts (Lifetime) No   Interrupted Attempts (Past 3 Months) No   Aborted or Self-Interrupted Attempt (Lifetime) No   Aborted or Self-Interrupted Attempt (Past 3 Months) No   Preparatory Acts or Behavior (Lifetime) No   Preparatory Acts or Behavior (Past 3 Months) No   Most Recent Attempt Actual Lethality Code NA   Most Lethal Attempt Actual Lethality Code NA     Patient denies current homicidal ideation and behaviors.  Patient denies current self-injurious ideation and behaviors.    Patient denied risk behaviors associated with substance use.  Patient denies any high risk behaviors associated with mental health symptoms.  Patient reports the following current concerns for their personal safety: None.  Patient reports there are firearms in the house.   The firearms are secured in a locked space.    History of Safety Concerns:  Patient denied a history of homicidal ideation.     Patient denied a history of personal safety concerns.    Patient denied a history of assaultive behaviors.    Patient denied a history of sexual assault behaviors.     Patient denied a history of risk behaviors associated with substance use.  Patient denies any history of high risk behaviors associated with mental health symptoms.  Patient reports the following protective factors: dedication to family or friends    Risk Plan:  See Recommendations for Safety and Risk Management Plan    Review of Symptoms per patient report:  Depression: No symptoms  Sarahy:  No Symptoms  Psychosis: No Symptoms  Anxiety: Nervousness  Panic:  No symptoms  Post Traumatic Stress Disorder:  No Symptoms   Eating Disorder: No Symptoms  ADD / ADHD:  No symptoms  Conduct Disorder: No symptoms  Autism Spectrum Disorder: No symptoms  Obsessive Compulsive Disorder: No Symptoms    Patient reports the following compulsive behaviors and treatment history: none reported.      Diagnostic Criteria:   A. The development of emotional or behavioral symptoms in  response to an identifiable stressor(s) occurring within 3 months of the onset of the stressor(s)  B. These symptoms or behaviors are clinically significant, as evidenced by one or both of the following:  C. The stress-related disturbance does not meet criteria for another disorder & is not not an exacerbation of another mental disorder  D. The symptoms do not represent normal bereavement  E. Once the stressor or its consequences have terminated, the symptoms do not persist for more than an additional 6 months    Functional Status:  Patient reports the following functional impairments: no functional impairments reported.     WHODAS:   WHODAS 2.0 Total Score 8/31/2021   Total Score 15     Nonprogrammatic care:  Patient is requesting basic services to address current mental health concerns.    Clinical Summary:  1. Reason for assessment: Ct is experiencing increased anxiety and stress at work.  2. 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  3. Principal DSM5 Diagnoses  (Sustained by DSM5 Criteria Listed Above):   Adjustment Disorders  309.24 (F43.22) With anxiety.  4. Other Diagnoses that is relevant to services:  n/a  5. Provisional Diagnosis:  n/a  6. Prognosis: Return to Normal Functioning.  7. Likely consequences of symptoms if not treated: anxiety and stress will continue to increase.  8. Client strengths include:  employed, motivated, open to learning and willing to ask questions .     Recommendations:     1. Plan for Safety and Risk Management:   Recommended that patient call 911 or go to the local ED should there be a change in any of these risk factors..  Report to child / adult protection services was NA.     2. Patient's identified n/a.     3. Initial Treatment will focus on:    Adjustment Difficulties related to: increased stress at job due to COVID pandemic.     4. Resources/Service Plan:    services are not indicated.   Modifications to  assist communication are not indicated.   Additional disability accommodations are not indicated.      5. Collaboration:   Collaboration / coordination of treatment will be initiated with the following  support professionals: primary care physician.      6.  Referrals:   The following referral(s) will be initiated: none needed. Next Scheduled Appointment: in two weeks.     A Release of Information has been obtained for the following: none needed.    7. RAMEZ:    RAMEZ:  Discussed the general effects of drugs and alcohol on health and well-being. Provider gave patient printed information about the effects of chemical use on their health and well being. Recommendations:  None at this time .     8. Records:   These were reviewed at time of assessment.   Information in this assessment was obtained from the medical record and provided by patient who is a good historian.    Patient will have open access to their mental health medical record.        Provider Name/ Credentials:  Luz Palencia MS, LADC, LPCC  August 31, 2021

## 2021-09-10 NOTE — PROGRESS NOTES
Assessment & Plan     Anxiety  Pt is doing well since decreasing dose from venlafaxine XR 300mg to 150mg daily.   She has also reduced alcohol to only 1 day per week  Sleep is improved  She is feeling better at work  Had 1 counseling session , doing so much better, not sure if she will keep her follow up appt.   Continue healthy habits. Recheck in 6mo, sooner if needed.   - venlafaxine (EFFEXOR-XR) 150 MG 24 hr capsule; Take 1 capsule (150 mg) by mouth daily    Asymptomatic spider vein  Superficial spider veins medial ankles bilaterally.   No significant sx.   Consider US if becoming symptomatic or changing  - US Venous Competency Bilateral; Future       Follow Up: The patient was instructed to contact clinic for worsening symptoms, non-improvement in time frame as expected/discussed, and for questions regarding medications or treatment plan. For virtual visits, the patient was advised to be seen for in person evaluation if symptoms or condition are worsening or non-improvement as expected.       No follow-ups on file.    Berta Umanzor PA-C  [facility] TOM Avila is a 61 year old who presents for the following health issues     History of Present Illness       Mental Health Follow-up:  Patient presents to follow-up on Anxiety.    Patient's anxiety since last visit has been:  Better  The patient is not having other symptoms associated with anxiety.  Any significant life events: relationship concerns and job concerns  Patient is not feeling anxious or having panic attacks.  Patient has no concerns about alcohol or drug use.     Social History  Tobacco Use    Smoking status: Never Smoker    Smokeless tobacco: Never Used  Vaping Use    Vaping Use: Never used  Alcohol use: Yes    Comment: occasional- 2-3d per week- 2-3 beers  Drug use: No      Today's PHQ-9         PHQ-9 Total Score:     (P) 0   PHQ-9 Q9 Thoughts of better off dead/self-harm past 2 weeks :   (P) Not at all  "  Thoughts of suicide or self harm:      Self-harm Plan:        Self-harm Action:          Safety concerns for self or others:           She eats 2-3 servings of fruits and vegetables daily.She consumes 0 sweetened beverage(s) daily.She exercises with enough effort to increase her heart rate 20 to 29 minutes per day.  She exercises with enough effort to increase her heart rate 3 or less days per week. She is missing 1 dose(s) of medications per week.  She is not taking prescribed medications regularly due to remembering to take.     Anxiety   Last visit reduced her dose of venlaxfaxine from 300mg to 150mg.   Thinks dose was too high and that was causing side effects.  She is feeling a lot better on this dose.   Her anxiety feels controlled  Focusing on task at hand and not rushing.   Doing better at work. Concentration is better.  No longer waking at night. She is \"sleeping like a baby\"    She is planning to retired May 1, 2022. They are making plans. She is excited. Spouse is planning to retire at the same time. They are focusing on this.    She met with therapist one time. Was getting to know you visit.     Still doing yoga daily.     She has cut back on alcohol- now only 1x per week and has 2 beverages. Had been daily at last visit.     Still exercising regularly.     Son is moving out- will have empty house.    Veins on ankles   Present for years. Superficial. Was concerned about them. They do not cause pain, achiness, itching. No leg swelling. No ulcers or sores on legs/feet. She did have injury of her right foot/ankle a number of years ago and was concerned was related. She is active with exercise.     Review of Systems   Constitutional, HEENT, cardiovascular, pulmonary, gi and gu systems are negative, except as otherwise noted.      Objective    /70   Pulse 84   Temp 97.8  F (36.6  C) (Temporal)   Resp 16   Ht 1.549 m (5' 1\")   Wt 65.1 kg (143 lb 8 oz)   LMP 12/10/2010 (Exact Date)   SpO2 100%   " BMI 27.11 kg/m    Body mass index is 27.11 kg/m .  Physical Exam   GENERAL: healthy, alert and no distress  EYES: Eyes grossly normal to inspection, PERRL and conjunctivae and sclerae normal  HENT: ear canals and TM's normal, nose and mouth without ulcers or lesions  NECK: no adenopathy, no asymmetry, masses, or scars and thyroid normal to palpation  RESP: lungs clear to auscultation - no rales, rhonchi or wheezes  CV: regular rate and rhythm, normal S1 S2, no S3 or S4, no murmur, click or rub, no peripheral edema and peripheral pulses strong. Superficial spider veins medial ankles , right foot dorsum of foot. No palpable varicosities. No calf tenderness. No edema. No ulcers.   ABDOMEN: soft, nontender, no hepatosplenomegaly, no masses and bowel sounds normal  MS: no gross musculoskeletal defects noted, no edema  NEURO: Normal strength and tone, mentation intact and speech normal  PSYCH: mentation appears normal, affect normal/bright                Answers for HPI/ROS submitted by the patient on 9/13/2021  If you checked off any problems, how difficult have these problems made it for you to do your work, take care of things at home, or get along with other people?: Not difficult at all  PHQ9 TOTAL SCORE: 0  GISELA 7 TOTAL SCORE: 2

## 2021-09-13 ENCOUNTER — OFFICE VISIT (OUTPATIENT)
Dept: FAMILY MEDICINE | Facility: CLINIC | Age: 61
End: 2021-09-13
Payer: COMMERCIAL

## 2021-09-13 VITALS
OXYGEN SATURATION: 100 % | HEART RATE: 84 BPM | BODY MASS INDEX: 27.09 KG/M2 | WEIGHT: 143.5 LBS | RESPIRATION RATE: 16 BRPM | DIASTOLIC BLOOD PRESSURE: 70 MMHG | SYSTOLIC BLOOD PRESSURE: 120 MMHG | HEIGHT: 61 IN | TEMPERATURE: 97.8 F

## 2021-09-13 DIAGNOSIS — I78.1 ASYMPTOMATIC SPIDER VEIN: ICD-10-CM

## 2021-09-13 DIAGNOSIS — F41.9 ANXIETY: Primary | ICD-10-CM

## 2021-09-13 PROCEDURE — 99214 OFFICE O/P EST MOD 30 MIN: CPT | Performed by: PHYSICIAN ASSISTANT

## 2021-09-13 RX ORDER — VENLAFAXINE HYDROCHLORIDE 150 MG/1
150 CAPSULE, EXTENDED RELEASE ORAL DAILY
Qty: 90 CAPSULE | Refills: 1 | Status: SHIPPED | OUTPATIENT
Start: 2021-09-13 | End: 2022-02-01

## 2021-09-13 ASSESSMENT — ANXIETY QUESTIONNAIRES
7. FEELING AFRAID AS IF SOMETHING AWFUL MIGHT HAPPEN: NOT AT ALL
GAD7 TOTAL SCORE: 2
4. TROUBLE RELAXING: NOT AT ALL
GAD7 TOTAL SCORE: 2
1. FEELING NERVOUS, ANXIOUS, OR ON EDGE: NOT AT ALL
GAD7 TOTAL SCORE: 2
7. FEELING AFRAID AS IF SOMETHING AWFUL MIGHT HAPPEN: NOT AT ALL
6. BECOMING EASILY ANNOYED OR IRRITABLE: NOT AT ALL
2. NOT BEING ABLE TO STOP OR CONTROL WORRYING: SEVERAL DAYS
8. IF YOU CHECKED OFF ANY PROBLEMS, HOW DIFFICULT HAVE THESE MADE IT FOR YOU TO DO YOUR WORK, TAKE CARE OF THINGS AT HOME, OR GET ALONG WITH OTHER PEOPLE?: NOT DIFFICULT AT ALL
3. WORRYING TOO MUCH ABOUT DIFFERENT THINGS: SEVERAL DAYS
5. BEING SO RESTLESS THAT IT IS HARD TO SIT STILL: NOT AT ALL

## 2021-09-13 ASSESSMENT — PAIN SCALES - GENERAL: PAINLEVEL: NO PAIN (0)

## 2021-09-13 ASSESSMENT — PATIENT HEALTH QUESTIONNAIRE - PHQ9
10. IF YOU CHECKED OFF ANY PROBLEMS, HOW DIFFICULT HAVE THESE PROBLEMS MADE IT FOR YOU TO DO YOUR WORK, TAKE CARE OF THINGS AT HOME, OR GET ALONG WITH OTHER PEOPLE: NOT DIFFICULT AT ALL
SUM OF ALL RESPONSES TO PHQ QUESTIONS 1-9: 0
SUM OF ALL RESPONSES TO PHQ QUESTIONS 1-9: 0

## 2021-09-13 ASSESSMENT — MIFFLIN-ST. JEOR: SCORE: 1158.29

## 2021-09-13 NOTE — PATIENT INSTRUCTIONS
Glad you are doing well on the venlafaxine 150mg daily  Will continue with that.   Continue with the reduced alcohol   Glad sleep is better     Keep up those healthy exercise habits!     Spider veins. Can  Ultrasound if you would like. Order in chart.  To schedule your Imaging Test or Specialty Referral please call:  [facility]   Radiology (imaging- mammogram, ultrasound, CT, MRI): [phone]  Speciality consultation [phone] [address]  [facility] [address]

## 2021-09-14 ENCOUNTER — VIRTUAL VISIT (OUTPATIENT)
Dept: PSYCHOLOGY | Facility: CLINIC | Age: 61
End: 2021-09-14
Payer: COMMERCIAL

## 2021-09-14 DIAGNOSIS — F43.22 ADJUSTMENT DISORDER WITH ANXIOUS MOOD: Primary | ICD-10-CM

## 2021-09-14 PROCEDURE — 99207 PR NON-BILLABLE SERV PER CHARTING: CPT | Performed by: COUNSELOR

## 2021-09-14 ASSESSMENT — ANXIETY QUESTIONNAIRES: GAD7 TOTAL SCORE: 2

## 2021-09-14 ASSESSMENT — PATIENT HEALTH QUESTIONNAIRE - PHQ9: SUM OF ALL RESPONSES TO PHQ QUESTIONS 1-9: 0

## 2021-09-14 NOTE — PROGRESS NOTES
"                                           Progress Note    Patient Name: Margarita See  Date: 2021         Service Type: Individual      Session Start Time: 4:30pm Session End Time: 4:45pm     Session Length: 15 min    Session #: 2    Attendees: Client attended alone    Service Modality:  Phone Visit:      Provider verified identity through the following two step process.  Patient provided:  Patient  and Patient address    The patient has been notified of the following:      \"We have found that certain health care needs can be provided without the need for a face to face visit.  This service lets us provide the care you need with a phone conversation.       I will have full access to your [facility] medical record during this entire phone call.   I will be taking notes for your medical record.      Since this is like an office visit, we will bill your insurance company for this service.       There are potential benefits and risks of telephone visits (e.g. limits to patient confidentiality) that differ from in-person visits.?Confidentiality still applies for telephone services, and nobody will record the visit.  It is important to be in a quiet, private space that is free of distractions (including cell phone or other devices) during the visit.??      If during the course of the call I believe a telephone visit is not appropriate, you will not be charged for this service\"     Consent has been obtained for this service by care team member: Yes        DATA  Interactive Complexity: No  Crisis: No      Progress Since Last Session (Related to Symptoms / Goals / Homework):   Symptoms: No change second individual therapy session    Homework: Partially completed     Episode of Care Goals: No improvement - ACTION (Actively working towards change); Intervened by reinforcing change plan / affirming steps taken    Current / Ongoing Stressors and Concerns:  Pt stated she has experienced a decrease in anxiety and " work stress. Stated she wants to stop therapy at this time and will reach out if she decides she needs additional support.     Treatment Objective(s) Addressed in This Session:   Treatment plan was not completed. Pt no longer needs services as she has experienced a decrease in anxiety.       Intervention:  Therapist utilized reflected listening as patient gave brief reflection of week. Patient processed decreased stress at work, finding distractions through family connection and coping better with experienced anxiety. She stated she feels stable on her current level of medication. Pt decided to stop therapy at this time and will reach out if she decides she needs support in the future.      ASSESSMENT: Current Emotional / Mental Status (status of significant symptoms):   Risk status (Self / Other harm or suicidal ideation)   Patient denies current fears or concerns for personal safety.   Patient denies current or recent suicidal ideation or behaviors.   Patient denies current or recent homicidal ideation or behaviors.   Patient denies current or recent self injurious behavior or ideation.   Patient denies other safety concerns.   Patient reports there has been no change in risk factors since their last session.     Patient reports there has been no change in protective factors since their last session.     Recommended that patient call 911 or go to the local ED should there be a change in any of these risk factors.     Appearance:   Appropriate  N/A - Phone session    Eye Contact:   N/A - Phone session    Psychomotor Behavior: Normal  N/A - Phone session    Attitude:   Cooperative  Pleasant   Orientation:   All   Speech    Rate / Production: Normal/ Responsive Normal     Volume:  Normal    Mood:    Normal   Affect:    Appropriate    Thought Content:  Clear    Thought Form:  Coherent  Logical    Insight:    Good      Medication Review:   No changes to current psychiatric medication(s)     Medication  "Compliance:   Yes     Changes in Health Issues:   None reported     Chemical Use Review:   Substance Use: Chemical use reviewed, no active concerns identified      Tobacco Use: No current tobacco use.      Diagnosis:  1. Adjustment disorder with anxious mood        Collateral Reports Completed:   Not Applicable    PLAN: (Patient Tasks / Therapist Tasks / Other)  1)Due to patient request, pt will be discharged from counseling services at this time. She will reach out again if she decides she needs additional support with anxiety.          RIYA ALFARO, [facility]  2021                           Discharge Summary  Multiple Sessions    Client Name: Margarita See MRN#: [id number] YOB: 1960    Discharge Date:   2021    Service Modality: Phone Visit:      Provider verified identity through the following two step process.  Patient provided:  Patient  and Patient address    The patient has been notified of the following:      \"We have found that certain health care needs can be provided without the need for a face to face visit.  This service lets us provide the care you need with a phone conversation.       I will have full access to your [facility] medical record during this entire phone call.   I will be taking notes for your medical record.      Since this is like an office visit, we will bill your insurance company for this service.       There are potential benefits and risks of telephone visits (e.g. limits to patient confidentiality) that differ from in-person visits.?Confidentiality still applies for telephone services, and nobody will record the visit.  It is important to be in a quiet, private space that is free of distractions (including cell phone or other devices) during the visit.??      If during the course of the call I believe a telephone visit is not appropriate, you will not be charged for this service\"     Consent has been obtained for this service by care " team member: Yes     Service Type: Individual      Session Start Time: 4:30pm  Session End Time: 4:45pm      Session Length: 15 min     Session #: 2     Attendees: Client attended alone      Focus of Treatment Objective(s):  Client's presenting concerns included: Adjustment Difficulties related to: stress at job  Stage of Change at time of Discharge: ACTION (Actively working towards change)    Medication Adherence:  NA    Chemical Use:  No    Assessment: Current Emotional / Mental Status (status of significant symptoms):    Risk status (Self / Other harm or suicidal ideation)  Client denies current fears or concerns for personal safety.  Client denies current or recent suicidal ideation or behaviors.  Client denies current or recent homicidal ideation or behaviors.  Client denies current or recent self injurious behavior or ideation.  Client denies other safety concerns.  A safety and risk management plan has not been developed at this time, however client was given the after-hours number should there be a change in any of these risk factors.    Appearance:   Appropriate  unable to assess phone visit   Eye Contact:   unable to assess phone visit   Psychomotor Behavior: Normal  unable to assess phone visit   Attitude:   Cooperative  Interested  Orientation:   All  Speech   Rate / Production: Normal    Volume:  Normal   Mood:    Normal  Affect:    Appropriate   Thought Content:  Clear   Thought Form:  Coherent  Logical   Insight:   Good     DSM5 Diagnoses: (Sustained by DSM5 Criteria Listed Above)  Diagnoses: Adjustment Disorders  309.24 (F43.22) With anxiety  Psychosocial & Contextual Factors: Patient is a 61 year old female who lives with her  and seeking services due to increased stress at work.   WHODAS 2.0 (12 item) Score: see chart    Reason for Discharge:  Not interested in therapy at this time      Aftercare Plan:  Client may resume counseling services at any time in the future by calling the [facility] Intake  Office, [phone].      RIYA ALFARO, [facility]  September 17, 2021

## 2021-10-23 ENCOUNTER — HEALTH MAINTENANCE LETTER (OUTPATIENT)
Age: 61
End: 2021-10-23

## 2021-11-24 ENCOUNTER — OFFICE VISIT (OUTPATIENT)
Dept: URGENT CARE | Facility: URGENT CARE | Age: 61
End: 2021-11-24
Payer: COMMERCIAL

## 2021-11-24 VITALS
HEART RATE: 103 BPM | DIASTOLIC BLOOD PRESSURE: 80 MMHG | TEMPERATURE: 97.8 F | SYSTOLIC BLOOD PRESSURE: 126 MMHG | WEIGHT: 144.7 LBS | BODY MASS INDEX: 27.34 KG/M2 | OXYGEN SATURATION: 100 %

## 2021-11-24 DIAGNOSIS — J01.90 ACUTE SINUSITIS WITH SYMPTOMS > 10 DAYS: Primary | ICD-10-CM

## 2021-11-24 LAB — SARS-COV-2 RNA RESP QL NAA+PROBE: POSITIVE

## 2021-11-24 PROCEDURE — 99213 OFFICE O/P EST LOW 20 MIN: CPT | Performed by: PHYSICIAN ASSISTANT

## 2021-11-24 PROCEDURE — U0005 INFEC AGEN DETEC AMPLI PROBE: HCPCS | Performed by: PHYSICIAN ASSISTANT

## 2021-11-24 PROCEDURE — U0003 INFECTIOUS AGENT DETECTION BY NUCLEIC ACID (DNA OR RNA); SEVERE ACUTE RESPIRATORY SYNDROME CORONAVIRUS 2 (SARS-COV-2) (CORONAVIRUS DISEASE [COVID-19]), AMPLIFIED PROBE TECHNIQUE, MAKING USE OF HIGH THROUGHPUT TECHNOLOGIES AS DESCRIBED BY CMS-2020-01-R: HCPCS | Performed by: PHYSICIAN ASSISTANT

## 2021-11-24 ASSESSMENT — ENCOUNTER SYMPTOMS
RESPIRATORY NEGATIVE: 1
CONSTITUTIONAL NEGATIVE: 1
PALPITATIONS: 0
SINUS PRESSURE: 1
FEVER: 0
RHINORRHEA: 1
WHEEZING: 0
HEADACHES: 1
SHORTNESS OF BREATH: 0
CHILLS: 0
GASTROINTESTINAL NEGATIVE: 1
SORE THROAT: 0
COUGH: 0
SINUS PAIN: 1
FATIGUE: 0
CARDIOVASCULAR NEGATIVE: 1

## 2021-11-24 NOTE — PROGRESS NOTES
Dawit Avila is a 61 year old who presents for the following health issues   HPI   Acute Illness  Acute illness concerns:   Onset/Duration: 2WEEKS  Symptoms:  Fever: no  Chills/Sweats: no  Headache (location?): YES  Sinus Pressure: YES  Conjunctivitis:  no  Ear Pain: no  Rhinorrhea: YES  Congestion: YES  Sore Throat: no  Cough: YES - post nasal drainage  Wheeze: no  Decreased Appetite: no  Nausea: no  Vomiting: no  Diarrhea: no  Dysuria/Freq.: no  Dysuria or Hematuria: no  Fatigue/Achiness: no  Sick/Strep Exposure: YES- ill contacts at home  Therapies tried and outcome: allergy med, pseudofed, nasal saline with minimal    Patient Active Problem List   Diagnosis     CARDIOVASCULAR SCREENING; LDL GOAL LESS THAN 160     Allergic rhinitis     Generalized anxiety disorder     Menometrorrhagia     Recurrent sinusitis     Current Outpatient Medications   Medication     Flaxseed, Linseed, (FLAXSEED OIL PO)     Probiotic Product (PROBIOTIC PO)     venlafaxine (EFFEXOR-XR) 150 MG 24 hr capsule     CRANBERRY PO     No current facility-administered medications for this visit.        Allergies   Allergen Reactions     Other [No Clinical Screening - See Comments]      Something for a yeast infection     Spinach Nausea and Vomiting       Review of Systems   Constitutional: Negative.  Negative for chills, fatigue and fever.   HENT: Positive for congestion, rhinorrhea, sinus pressure and sinus pain. Negative for ear discharge, ear pain, hearing loss and sore throat.    Respiratory: Negative.  Negative for cough, shortness of breath and wheezing.    Cardiovascular: Negative.  Negative for chest pain, palpitations and peripheral edema.   Gastrointestinal: Negative.    Neurological: Positive for headaches.   All other systems reviewed and are negative.           Objective    /80 (BP Location: Right arm, Patient Position: Sitting, Cuff Size: Adult Regular)   Pulse 103   Temp 97.8  F (36.6  C) (Tympanic)   Wt 65.6 kg (144  lb 11.2 oz)   LMP 12/10/2010 (Exact Date)   SpO2 100%   BMI 27.34 kg/m    Body mass index is 27.34 kg/m .  Physical Exam  Vitals and nursing note reviewed.   Constitutional:       General: She is not in acute distress.     Appearance: Normal appearance. She is well-developed and normal weight. She is not ill-appearing.   HENT:      Head: Normocephalic and atraumatic.      Comments: TMs are intact without any erythema or bulging bilaterally.  Airway is patent.     Nose: Mucosal edema, congestion and rhinorrhea present. Rhinorrhea is purulent.      Right Turbinates: Swollen.      Left Turbinates: Swollen.      Right Sinus: Maxillary sinus tenderness and frontal sinus tenderness present.      Left Sinus: Maxillary sinus tenderness and frontal sinus tenderness present.      Mouth/Throat:      Lips: Pink.      Mouth: Mucous membranes are moist.      Pharynx: Oropharynx is clear. Uvula midline. No pharyngeal swelling, oropharyngeal exudate or posterior oropharyngeal erythema.      Tonsils: No tonsillar exudate.   Eyes:      General: No scleral icterus.     Extraocular Movements: Extraocular movements intact.      Conjunctiva/sclera: Conjunctivae normal.      Pupils: Pupils are equal, round, and reactive to light.   Neck:      Thyroid: No thyromegaly.   Cardiovascular:      Rate and Rhythm: Normal rate and regular rhythm.      Pulses: Normal pulses.      Heart sounds: Normal heart sounds, S1 normal and S2 normal. No murmur heard.  No friction rub. No gallop.    Pulmonary:      Effort: Pulmonary effort is normal. No accessory muscle usage, respiratory distress or retractions.      Breath sounds: Normal breath sounds and air entry. No stridor. No decreased breath sounds, wheezing, rhonchi or rales.   Musculoskeletal:      Cervical back: Normal range of motion and neck supple.   Lymphadenopathy:      Cervical: No cervical adenopathy.   Skin:     General: Skin is warm and dry.      Nails: There is no clubbing.    Neurological:      Mental Status: She is alert and oriented to person, place, and time.   Psychiatric:         Mood and Affect: Mood normal.         Behavior: Behavior normal.         Thought Content: Thought content normal.         Judgment: Judgment normal.          Assessment/Plan:  Acute sinusitis with symptoms > 10 days:  Will treat with [id number] for sinusitis and take with food/probiotics to minimize GI upset.  Will also check for covid as well.  Recommend tylenol/ibuprofen prn pain/fever and zyrtec/pseudofed for sinus congestion.   Rest, fluids, chicken soup.  Recheck in clinic if symptoms worsen or if symptoms do not improve.    -     Symptomatic COVID-19 Virus (Coronavirus) by PCR Nose  -     amoxicillin-clavulanate (AUGMENTIN) 875-125 MG tablet; Take 1 tablet by mouth 2 times daily for 10 days        Sheyla Theodore PA-C

## 2022-02-01 ENCOUNTER — MYC MEDICAL ADVICE (OUTPATIENT)
Dept: FAMILY MEDICINE | Facility: CLINIC | Age: 62
End: 2022-02-01
Payer: COMMERCIAL

## 2022-02-01 DIAGNOSIS — F41.9 ANXIETY: ICD-10-CM

## 2022-02-01 RX ORDER — VENLAFAXINE HYDROCHLORIDE 150 MG/1
150 CAPSULE, EXTENDED RELEASE ORAL DAILY
Qty: 90 CAPSULE | Refills: 0 | Status: SHIPPED | OUTPATIENT
Start: 2022-02-01 | End: 2022-03-09

## 2022-02-01 NOTE — TELEPHONE ENCOUNTER
Prescription approved per [facility] Refill Protocol.  Helga Jonas RN on 2/1/2022 at 9:39 AM

## 2022-02-09 ENCOUNTER — E-VISIT (OUTPATIENT)
Dept: FAMILY MEDICINE | Facility: CLINIC | Age: 62
End: 2022-02-09
Payer: COMMERCIAL

## 2022-02-09 DIAGNOSIS — F41.1 GAD (GENERALIZED ANXIETY DISORDER): Primary | ICD-10-CM

## 2022-02-09 PROCEDURE — 99421 OL DIG E/M SVC 5-10 MIN: CPT | Performed by: PHYSICIAN ASSISTANT

## 2022-02-10 RX ORDER — VENLAFAXINE HYDROCHLORIDE 75 MG/1
75 CAPSULE, EXTENDED RELEASE ORAL DAILY
Qty: 30 CAPSULE | Refills: 0 | Status: SHIPPED | OUTPATIENT
Start: 2022-02-10 | End: 2022-03-09 | Stop reason: DRUGHIGH

## 2022-02-10 NOTE — PATIENT INSTRUCTIONS
Reinaldo Avila,   I am glad you are doing better! That is great news.   Let's reduce venlafaxine from 150mg to 75mg daily.   I advise taking the 75mg daily for 1 month.   If this is going well and you feel well, then would decrease to 37.5mg for 2-4 weeks and then discontinue.   If you start to have more anxiety or are not sure you want to stop the medication completely please let me know.  Let me know if you have questions.  Berta Umanzor PA-C

## 2022-02-12 ENCOUNTER — HEALTH MAINTENANCE LETTER (OUTPATIENT)
Age: 62
End: 2022-02-12

## 2022-02-28 NOTE — PROGRESS NOTES
"  Assessment & Plan     GISELA (generalized anxiety disorder)  Decreasing dose from 75mg to 37.5mg to discontinue.  Will take lower dose for 3-4 weeks then stop.  Continue healthy habits - weight watchers, exercise, limiting alcohol    - venlafaxine (EFFEXOR-XR) 37.5 MG 24 hr capsule; Take 1 capsule (37.5 mg) by mouth daily    Dependent rubor  Screen for arterial disease   - US SANTI Doppler with Exercise Bilateral; Future  - Lipid panel reflex to direct LDL Non-fasting; Future  - Lipid panel reflex to direct LDL Non-fasting    Screening for hyperlipidemia  Screen non-fasting     Need for Tdap vaccination  Given   - TDAP VACCINE (Adacel, Boostrix)  [[id number]]     BMI:   Estimated body mass index is 27.96 kg/m  as calculated from the following:    Height as of this encounter: 1.549 m (5' 1\").    Weight as of this encounter: 67.1 kg (148 lb).   Weight management plan: Discussed healthy diet and exercise guidelines    Follow Up: The patient was instructed to contact clinic for worsening symptoms, non-improvement in time frame as expected/discussed, and for questions regarding medications or treatment plan. For virtual visits, the patient was advised to be seen for in person evaluation if symptoms or condition are worsening or non-improvement as expected.       Return in about 4 weeks (around 4/6/2022) for Recheck if needed for non-improvement.    Berta Umanzor PA-C  [facility] TOM Avila is a 62 year old who presents for the following health issues     History of Present Illness       Mental Health Follow-up:  Patient presents to follow-up on Anxiety.    Patient's anxiety since last visit has been:  Good  The patient is not having other symptoms associated with anxiety.  Any significant life events: other  Patient is not feeling anxious or having panic attacks.  Patient has no concerns about alcohol or drug use.       Today's PHQ-9         PHQ-9 Total Score: 0  PHQ-9 Q9 Thoughts of better " "off dead/self-harm past 2 weeks :   (P) Not at all    How difficult have these problems made it for you to do your work, take care of things at home, or get along with other people: Not difficult at all    Today's GISELA-7 Score: 0    She eats 2-3 servings of fruits and vegetables daily.She consumes 0 sweetened beverage(s) daily.She exercises with enough effort to increase her heart rate 30 to 60 minutes per day.  She exercises with enough effort to increase her heart rate 3 or less days per week.   She is taking medications regularly.       Flying to [place] with a childhood best friend next week.     Anxiety-   Work was a huge stressor for her. She retired 12-.  Is doing well since residential.   She doesn't feel like she needs to be on the venlafaxine anymore.  E-visit last month to reduce the dose from 150mg to 75mg.   Has new granddaughter 5mo old. 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 Loves that. They do pizza night fridays.  Is weight training with her son.   She is doing weight watchers- weight is down.   Sleep is good.   Energy is good.   Does notice her focus is not as good with being off- jumps from thing to thing. But working on meditation and yoga to help with that.   Spouse found out he has colon cancer on routine colonoscopy.    Toes- right foot  Red dusky color- Adina great toe.   Doesn't hurt.   Very rarely at night wakes up and hangs foot out of sheets.   No claudication.   Nonsmoker  Last lipids 10 yr ago.    Normo tensive.         Review of Systems   Constitutional, HEENT, cardiovascular, pulmonary, gi and gu systems are negative, except as otherwise noted.      Objective    /66   Pulse 81   Temp 97.9  F (36.6  C) (Temporal)   Resp 12   Ht 1.549 m (5' 1\")   Wt 67.1 kg (148 lb)   LMP 12/10/2010 (Exact Date)   SpO2 95%   BMI 27.96 kg/m    Body mass index is 27.96 kg/m .  Physical Exam   GENERAL: healthy, alert and no distress  EYES: Eyes grossly normal to " inspection, PERRL and conjunctivae and sclerae normal  HENT: ear canals and TM's normal, nose and mouth without ulcers or lesions  NECK: no adenopathy, no asymmetry, masses, or scars and thyroid normal to palpation  RESP: lungs clear to auscultation - no rales, rhonchi or wheezes  CV: regular rate and rhythm, normal S1 S2, no S3 or S4, no murmur, click or rub, no peripheral edema and peripheral pulses palpable- dependent rubor of right foot toes, no erythema or warmth.   ABDOMEN: soft, nontender, no hepatosplenomegaly, no masses and bowel sounds normal  MS: no gross musculoskeletal defects noted, no edema  SKIN: no suspicious lesions or rashes  NEURO: Normal strength and tone, mentation intact and speech normal

## 2022-03-09 ENCOUNTER — OFFICE VISIT (OUTPATIENT)
Dept: FAMILY MEDICINE | Facility: CLINIC | Age: 62
End: 2022-03-09
Payer: COMMERCIAL

## 2022-03-09 VITALS
SYSTOLIC BLOOD PRESSURE: 124 MMHG | HEART RATE: 81 BPM | BODY MASS INDEX: 27.94 KG/M2 | WEIGHT: 148 LBS | OXYGEN SATURATION: 95 % | RESPIRATION RATE: 12 BRPM | TEMPERATURE: 97.9 F | HEIGHT: 61 IN | DIASTOLIC BLOOD PRESSURE: 66 MMHG

## 2022-03-09 DIAGNOSIS — F41.1 GAD (GENERALIZED ANXIETY DISORDER): Primary | ICD-10-CM

## 2022-03-09 DIAGNOSIS — R09.89 ABNORMAL VASCULAR FLOW: ICD-10-CM

## 2022-03-09 DIAGNOSIS — Z13.220 SCREENING FOR HYPERLIPIDEMIA: ICD-10-CM

## 2022-03-09 DIAGNOSIS — Z23 NEED FOR TDAP VACCINATION: ICD-10-CM

## 2022-03-09 DIAGNOSIS — L53.9 DEPENDENT RUBOR: ICD-10-CM

## 2022-03-09 PROCEDURE — 90715 TDAP VACCINE 7 YRS/> IM: CPT | Performed by: PHYSICIAN ASSISTANT

## 2022-03-09 PROCEDURE — 80061 LIPID PANEL: CPT | Performed by: PHYSICIAN ASSISTANT

## 2022-03-09 PROCEDURE — 36415 COLL VENOUS BLD VENIPUNCTURE: CPT | Performed by: PHYSICIAN ASSISTANT

## 2022-03-09 PROCEDURE — 90471 IMMUNIZATION ADMIN: CPT | Performed by: PHYSICIAN ASSISTANT

## 2022-03-09 PROCEDURE — 99214 OFFICE O/P EST MOD 30 MIN: CPT | Mod: 25 | Performed by: PHYSICIAN ASSISTANT

## 2022-03-09 RX ORDER — VENLAFAXINE HYDROCHLORIDE 75 MG/1
75 CAPSULE, EXTENDED RELEASE ORAL DAILY
Qty: 30 CAPSULE | Refills: 0 | Status: CANCELLED | OUTPATIENT
Start: 2022-03-09

## 2022-03-09 RX ORDER — VENLAFAXINE HYDROCHLORIDE 150 MG/1
150 CAPSULE, EXTENDED RELEASE ORAL DAILY
Qty: 90 CAPSULE | Refills: 0 | Status: CANCELLED | OUTPATIENT
Start: 2022-03-09

## 2022-03-09 RX ORDER — VENLAFAXINE HYDROCHLORIDE 37.5 MG/1
37.5 CAPSULE, EXTENDED RELEASE ORAL DAILY
Qty: 30 CAPSULE | Refills: 0 | Status: SHIPPED | OUTPATIENT
Start: 2022-03-09 | End: 2022-04-29

## 2022-03-09 ASSESSMENT — PATIENT HEALTH QUESTIONNAIRE - PHQ9
SUM OF ALL RESPONSES TO PHQ QUESTIONS 1-9: 0
SUM OF ALL RESPONSES TO PHQ QUESTIONS 1-9: 0
10. IF YOU CHECKED OFF ANY PROBLEMS, HOW DIFFICULT HAVE THESE PROBLEMS MADE IT FOR YOU TO DO YOUR WORK, TAKE CARE OF THINGS AT HOME, OR GET ALONG WITH OTHER PEOPLE: NOT DIFFICULT AT ALL

## 2022-03-09 ASSESSMENT — PAIN SCALES - GENERAL: PAINLEVEL: NO PAIN (0)

## 2022-03-09 ASSESSMENT — ANXIETY QUESTIONNAIRES
5. BEING SO RESTLESS THAT IT IS HARD TO SIT STILL: NOT AT ALL
1. FEELING NERVOUS, ANXIOUS, OR ON EDGE: NOT AT ALL
3. WORRYING TOO MUCH ABOUT DIFFERENT THINGS: NOT AT ALL
GAD7 TOTAL SCORE: 0
4. TROUBLE RELAXING: NOT AT ALL
6. BECOMING EASILY ANNOYED OR IRRITABLE: NOT AT ALL
GAD7 TOTAL SCORE: 0
2. NOT BEING ABLE TO STOP OR CONTROL WORRYING: NOT AT ALL
7. FEELING AFRAID AS IF SOMETHING AWFUL MIGHT HAPPEN: NOT AT ALL
7. FEELING AFRAID AS IF SOMETHING AWFUL MIGHT HAPPEN: NOT AT ALL
GAD7 TOTAL SCORE: 0

## 2022-03-09 NOTE — PATIENT INSTRUCTIONS
Enjoy your trip!     Keep up healthy habits!     Reduced dose of venlafaxine to 37.5mg daily.   Take for 3 weeks (up to 1 month)  Then discontinue  If anxiety returns can try new medication    Skin changes and circulatory changes in the toes- plan for SANTI with exercise. Checking blood pressures and blood flow into legs at rest and with exercise. Looking for vascular disease.   To schedule your Imaging Test or Specialty Referral please call:  [facility]   Radiology (imaging- mammogram, ultrasound, CT, MRI): [phone]  Speciality consultation [phone] [address]  [facility] [address]

## 2022-03-09 NOTE — NURSING NOTE
Prior to immunization administration, verified patients identity using patient s name and date of birth. Please see Immunization Activity for additional information.     Screening Questionnaire for Adult Immunization    Are you sick today?   No   Do you have allergies to medications, food, a vaccine component or latex?   No   Have you ever had a serious reaction after receiving a vaccination?   No   Do you have a long-term health problem with heart, lung, kidney, or metabolic disease (e.g., diabetes), asthma, a blood disorder, no spleen, complement component deficiency, a cochlear implant, or a spinal fluid leak?  Are you on long-term aspirin therapy?   No   Do you have cancer, leukemia, HIV/AIDS, or any other immune system problem?   No   Do you have a parent, brother, or sister with an immune system problem?   No   In the past 3 months, have you taken medications that affect  your immune system, such as prednisone, other steroids, or anticancer drugs; drugs for the treatment of rheumatoid arthritis, Crohn s disease, or psoriasis; or have you had radiation treatments?   No   Have you had a seizure, or a brain or other nervous system problem?   No   During the past year, have you received a transfusion of blood or blood    products, or been given immune (gamma) globulin or antiviral drug?   No   For women: Are you pregnant or is there a chance you could become       pregnant during the next month?   No   Have you received any vaccinations in the past 4 weeks?   No     Immunization questionnaire answers were all negative.        Per orders of Berta Uamnzor PA-C, injection of tdap given by Katiana Baron CMA. Patient instructed to remain in clinic for 15 minutes afterwards, and to report any adverse reaction to me immediately.       Screening performed by Katiana Baron CMA on 3/9/2022 at 4:33 PM.

## 2022-03-10 LAB
CHOLEST SERPL-MCNC: 195 MG/DL
FASTING STATUS PATIENT QL REPORTED: NO
HDLC SERPL-MCNC: 105 MG/DL
LDLC SERPL CALC-MCNC: 77 MG/DL
NONHDLC SERPL-MCNC: 90 MG/DL
TRIGL SERPL-MCNC: 67 MG/DL

## 2022-03-10 ASSESSMENT — ANXIETY QUESTIONNAIRES: GAD7 TOTAL SCORE: 0

## 2022-03-10 ASSESSMENT — PATIENT HEALTH QUESTIONNAIRE - PHQ9: SUM OF ALL RESPONSES TO PHQ QUESTIONS 1-9: 0

## 2022-03-11 ENCOUNTER — ANCILLARY PROCEDURE (OUTPATIENT)
Dept: ULTRASOUND IMAGING | Facility: CLINIC | Age: 62
End: 2022-03-11
Attending: PHYSICIAN ASSISTANT
Payer: COMMERCIAL

## 2022-03-11 DIAGNOSIS — L53.9 DEPENDENT RUBOR: ICD-10-CM

## 2022-03-11 PROCEDURE — 93924 LWR XTR VASC STDY BILAT: CPT | Mod: GC | Performed by: RADIOLOGY

## 2022-04-09 ENCOUNTER — OFFICE VISIT (OUTPATIENT)
Dept: URGENT CARE | Facility: URGENT CARE | Age: 62
End: 2022-04-09
Payer: COMMERCIAL

## 2022-04-09 VITALS
TEMPERATURE: 97.9 F | SYSTOLIC BLOOD PRESSURE: 142 MMHG | HEART RATE: 114 BPM | DIASTOLIC BLOOD PRESSURE: 79 MMHG | OXYGEN SATURATION: 98 % | WEIGHT: 148.8 LBS | BODY MASS INDEX: 28.12 KG/M2

## 2022-04-09 DIAGNOSIS — Z88.9 HISTORY OF SEASONAL ALLERGIES: ICD-10-CM

## 2022-04-09 DIAGNOSIS — J01.90 ACUTE SINUSITIS WITH SYMPTOMS > 10 DAYS: Primary | ICD-10-CM

## 2022-04-09 PROCEDURE — 99214 OFFICE O/P EST MOD 30 MIN: CPT | Performed by: PHYSICIAN ASSISTANT

## 2022-04-09 NOTE — PROGRESS NOTES
Chief Complaint   Patient presents with     URI     Sinus infection- left sided dental pain, drainage. Negatibe covid test. Onset- Three weeks                 ASSESSMENT:       ICD-10-CM    1. Acute sinusitis with symptoms > 10 days  J01.90 amoxicillin-clavulanate (AUGMENTIN) 875-125 MG tablet   2. History of seasonal allergies  Z88.9            PLAN:  I have discussed clinical findings with patient.  Side effects of medications discussed.  Symptomatic care is discussed.  I have discussed the possibility of  worsening symptoms and indication to RTC or go to the ER if they occur.  All questions are answered, patient indicates understanding of these issues and is in agreement with plan.   Patient care instructions are discussed/given at the end of visit.   Lots of rest and fluids.      My Plunkett PA-C      SUBJECTIVE:  62-year-old female presents for acute sinusitis.  Was in [place] and just came back.  Negative Covid test.  Left sinus congestion, headache, congestion, pressure.  Left upper teeth ache.  No fever.  Seasonal allergies flared well in [place].      Allergies   Allergen Reactions     Other [No Clinical Screening - See Comments]      Something for a yeast infection     Spinach Nausea and Vomiting       Past Medical History:   Diagnosis Date     Allergic rhinitis 11/23/2010     History of asthma      Menorrhagia        CRANBERRY PO,   Flaxseed, Linseed, (FLAXSEED OIL PO),   Probiotic Product (PROBIOTIC PO),   venlafaxine (EFFEXOR-XR) 37.5 MG 24 hr capsule, Take 1 capsule (37.5 mg) by mouth daily    No current facility-administered medications on file prior to visit.      Social History     Tobacco Use     Smoking status: Never Smoker     Smokeless tobacco: Never Used   Substance Use Topics     Alcohol use: Yes     Comment: occasional- 2-3d per week- 2-3 beers       ROS:  CONSTITUTIONAL: Negative for fatigue or fever.  EYES: Negative for eye problems.  ENT: As above.  RESP: Negative for shortness of  breath   CV: Negative for chest pains.  GI: Negative for vomiting.  MUSCULOSKELETAL:  Negative for significant muscle or joint pains.  NEUROLOGIC: Negative for headaches.  SKIN: Negative for rash.  PSYCH: Normal mentation for age.    OBJECTIVE:  BP (!) 142/79 (BP Location: Left arm, Patient Position: Sitting, Cuff Size: Adult Regular)   Pulse 114   Temp 97.9  F (36.6  C) (Tympanic)   Wt 67.5 kg (148 lb 12.8 oz)   LMP 12/10/2010 (Exact Date)   SpO2 98%   BMI 28.12 kg/m    GENERAL APPEARANCE: Healthy, alert and no distress.  EYES:Conjunctiva/sclera clear.  EARS: No cerumen.   Ear canals w/o erythema.  TM's intact w/o erythema.    SINUSES: Moderate left maxillary sinus tenderness.  THROAT: No erythema w/o tonsillar enlargement . No exudates.  NECK: Supple, nontender, no lymphadenopathy.  RESP: Lungs clear to auscultation - no rales, rhonchi or wheezes  CV: Regular rate and rhythm, normal S1 S2, no murmur noted.  NEURO: Awake, alert    SKIN: No rashes        My Plunkett PA-C

## 2022-04-29 ENCOUNTER — NURSE TRIAGE (OUTPATIENT)
Dept: NURSING | Facility: CLINIC | Age: 62
End: 2022-04-29
Payer: COMMERCIAL

## 2022-04-29 ENCOUNTER — MYC MEDICAL ADVICE (OUTPATIENT)
Dept: FAMILY MEDICINE | Facility: CLINIC | Age: 62
End: 2022-04-29

## 2022-04-29 ENCOUNTER — TELEPHONE (OUTPATIENT)
Dept: FAMILY MEDICINE | Facility: CLINIC | Age: 62
End: 2022-04-29

## 2022-04-29 ENCOUNTER — OFFICE VISIT (OUTPATIENT)
Dept: FAMILY MEDICINE | Facility: CLINIC | Age: 62
End: 2022-04-29
Payer: COMMERCIAL

## 2022-04-29 VITALS
RESPIRATION RATE: 16 BRPM | BODY MASS INDEX: 28.66 KG/M2 | OXYGEN SATURATION: 99 % | DIASTOLIC BLOOD PRESSURE: 72 MMHG | HEART RATE: 102 BPM | SYSTOLIC BLOOD PRESSURE: 112 MMHG | WEIGHT: 151.8 LBS | HEIGHT: 61 IN | TEMPERATURE: 97.5 F

## 2022-04-29 DIAGNOSIS — R10.9 ABDOMINAL CRAMPING: ICD-10-CM

## 2022-04-29 DIAGNOSIS — R10.9 ABDOMINAL CRAMPING: Primary | ICD-10-CM

## 2022-04-29 PROCEDURE — 99213 OFFICE O/P EST LOW 20 MIN: CPT | Performed by: FAMILY MEDICINE

## 2022-04-29 NOTE — TELEPHONE ENCOUNTER
Provider Response to 2nd Level Triage Request    I have reviewed the RN documentation. My recommendation is:  Face To Face Visit. Same Day: to be seen by another provider in same service line     crampy abd pain and diarrhea after antibiotics. Potential for c.diff. should have visit.   Berta Umanzor PA-C

## 2022-04-29 NOTE — TELEPHONE ENCOUNTER
Pt cannot  Rx because it is a compound.     Please resend to an area compounding pharmacy ([facility]) and then let pt know where to pick it up

## 2022-04-29 NOTE — PROGRESS NOTES
"  Assessment & Plan     Abdominal cramping  The patient has a history of diverticulosis with Bilateral lower quadrant pain after Augmentin for sinusitis. Her abdominal exam was benign, doubt diverticulitis at this time but plan for a stat CT abdomen/pelvis on Monday 5/2 if her symptoms worsen or persist.  - lidocaine (viscous) 15 mL, alum & mag hydroxide-simethicone 15 mL GI Cocktail; Take 30 mLs by mouth once as needed for mouth, throat or stomach pain or indigestion        No follow-ups on file.    Alfredo Burciaga MD  [facility] TOM Avila is a 62 year old who presents for the following health issues  accompanied by her Self.    History of Present Illness       Reason for visit:  Irritated colon due to medication I just finished.  Symptom onset:  3-4 weeks ago  Symptoms include:  Frequent stooling at first, then irritated colon with cramping  Symptom intensity:  Moderate  Symptom progression:  Improving  Had these symptoms before:  Yes  Has tried/received treatment for these symptoms:  Yes  Previous treatment was successful:  Yes  Prior treatment description:  Diet changes, and pills for cramping.  What makes it worse:  When eating a lot of food.  What makes it better:  Taking ibuprofen, and gas pills    She eats 2-3 servings of fruits and vegetables daily.She consumes 0 sweetened beverage(s) daily.She exercises with enough effort to increase her heart rate 9 or less minutes per day.  She exercises with enough effort to increase her heart rate 3 or less days per week.   She is taking medications regularly.       Review of Systems   Constitutional, HEENT, cardiovascular, pulmonary, GI, , musculoskeletal, neuro, skin, endocrine and psych systems are negative, except as otherwise noted.      Objective    /72   Pulse 102   Temp 97.5  F (36.4  C) (Temporal)   Resp 16   Ht 1.549 m (5' 0.98\")   Wt 68.9 kg (151 lb 12.8 oz)   LMP 12/10/2010 (Exact Date)   SpO2 99%   BMI " 28.70 kg/m    Body mass index is 28.7 kg/m .  Physical Exam   GENERAL: healthy, alert and no distress  EYES: Eyes grossly normal to inspection, PERRL and conjunctivae and sclerae normal  HENT: ear canals and TM's normal, nose and mouth without ulcers or lesions  NECK: no adenopathy, no asymmetry, masses, or scars and thyroid normal to palpation  RESP: lungs clear to auscultation - no rales, rhonchi or wheezes  CV: regular rate and rhythm, normal S1 S2, no S3 or S4, no murmur, click or rub, no peripheral edema and peripheral pulses strong  ABDOMEN: soft, nontender, no hepatosplenomegaly, no masses and bowel sounds normal  MS: no gross musculoskeletal defects noted, no edema

## 2022-04-29 NOTE — TELEPHONE ENCOUNTER
"Pt calling with stomach cramps and frequent bowel movements.    Pt says she is having \"colon irritation\" from taking an antibiotic for her sinus infection a couple weeks ago. Says she was going to the bathroom \"all the time,\" but that it is a normal color and consistency. She says her cramping is intermittent but can get very painful. She has been eating lots of vegetables and fruits and high fiber food along with yogurt and taking a probiotic daily.    Would like to know if there is a medication she can take to help with the spasms and cramping.     Protocol recommends See in Office Today. Will route to provider for second level triage. Care advice given to pt that she should try clear liquids for awhile until she notices her bowel movements slowing down. Then can advance to a bland diet. Pt verbalized understanding.    Diane Vázquez, RN, BSN  [facility]   Triage Nurse Advisor      Reason for Disposition    MODERATE OR MILD pain that comes and goes (cramps) lasts > 24 hours    Additional Information    Negative: Chest pain    Negative: Pain is mainly in upper abdomen (if needed ask: 'is it mainly above the belly button?')    Negative: Abdominal pain and pregnant > 20 weeks    Negative: Abdominal pain and pregnant < 20 weeks    Negative: Passed out (i.e., fainted, collapsed and was not responding)    Negative: Shock suspected (e.g., cold/pale/clammy skin, too weak to stand, low BP, rapid pulse)    Negative: Sounds like a life-threatening emergency to the triager    Negative: SEVERE abdominal pain (e.g., excruciating)    Negative: Vomiting red blood or black (coffee ground) material    Negative: Bloody, black, or tarry bowel movements (Exception: chronic-unchanged black-grey bowel movements and is taking iron pills or Pepto-bismol)    Negative: Constant abdominal pain lasting > 2 hours    Negative: Vomiting bile (green color)    Negative: Patient sounds very sick or weak to the triager    Negative: Pregnant or " could be pregnant (i.e., missed last menstrual period)    Negative: Vomiting and abdomen looks much more swollen than usual    Negative: Blood in urine (red, pink, or tea-colored)    Negative: Fever > 103 F (39.4 C)    Negative: Fever > 101 F (38.3 C) and over 60 years of age    Negative: Fever > 100.0 F (37.8 C) and has diabetes mellitus or a weak immune system (e.g., HIV positive, cancer chemotherapy, organ transplant, splenectomy, chronic steroids)    Negative: Fever > 100.0 F (37.8 C) and bedridden (e.g., nursing home patient, stroke, chronic illness, recovering from surgery)    Negative: White of the eyes have turned yellow (i.e., jaundice)    Protocols used: ABDOMINAL PAIN - FEMALE-A-OH

## 2022-04-29 NOTE — TELEPHONE ENCOUNTER
Contacted pt. Advised her of provider message. She states that she has been having issues and had a colonoscopy already. Advised her of need to rule out c-diff and other possibilities due to her recent antibiotic use though. Pt verbalized understanding and is in agreement with plan. Appointment scheduled.     Helga Jonas, JUANN, RN, PHN  Registered Nurse-Clinic Triage  [facility]  4/29/2022 at 9:54 AM

## 2022-04-30 RX ORDER — TRAMADOL HYDROCHLORIDE 50 MG/1
50 TABLET ORAL EVERY 6 HOURS PRN
Qty: 10 TABLET | Refills: 0 | Status: SHIPPED | OUTPATIENT
Start: 2022-04-30 | End: 2022-05-03

## 2022-04-30 RX ORDER — FAMOTIDINE 20 MG/1
20 TABLET, FILM COATED ORAL 2 TIMES DAILY
Qty: 180 TABLET | Refills: 0 | Status: SHIPPED | OUTPATIENT
Start: 2022-04-30 | End: 2023-11-22

## 2022-05-25 ENCOUNTER — MYC MEDICAL ADVICE (OUTPATIENT)
Dept: FAMILY MEDICINE | Facility: CLINIC | Age: 62
End: 2022-05-25
Payer: COMMERCIAL

## 2022-05-26 NOTE — TELEPHONE ENCOUNTER
Patient was seen on 03/09/22 for anxiety by Berta Umanzor. Berta would you recommend another appointment?     Anna Flynn, JUANN, RN, PHN  [facility] RN for [place] [place]  May 26, 2022

## 2022-06-03 NOTE — PROGRESS NOTES
Assessment & Plan     GISELA (generalized anxiety disorder)  Discussed SSRI medication options, possible side effects, how to take, risks vs benefits, onset of sx improvement and alternatives.   Counseling referral discussed- see orders/AVS if indicated  Questions answered  Start medication as below.   Follow up 3-4 weeks sooner if worsening. Crises resources discussed.  - sertraline (ZOLOFT) 50 MG tablet; Take 1 tablet (50 mg) by mouth daily  }     Follow Up: The patient was instructed to contact clinic for worsening symptoms, non-improvement in time frame as expected/discussed, and for questions regarding medications or treatment plan. For virtual visits, the patient was advised to be seen for in person evaluation if symptoms or condition are worsening or non-improvement as expected.       Return in about 4 weeks (around 7/6/2022).    Berta Umanzor PA-C  [facility] TOM Avila is a 62 year old who presents for the following health issues     History of Present Illness       Reason for visit:  Would like a new medication for anxiety, spouse has cancer    She eats 2-3 servings of fruits and vegetables daily.She consumes 1 sweetened beverage(s) daily.She exercises with enough effort to increase her heart rate 20 to 29 minutes per day.  She exercises with enough effort to increase her heart rate 3 or less days per week.   She is taking medications regularly.    Anxiety    Had been on venlafaxine for few years but had side effects with it. caused nightmares.    has prostate cancer, needing surgery. With this news her anxiety has come back and is high again.  Sister and daughter told her they could tell that when her venlafaxine wore off she is more anxious and can't stay focused.   Worrying, trouble relaxing.   Trouble stopping anxious thoughts.   Sleep- harder time lately.   Trying to spend time outside, reading, crafting.  Alcohol- using to try to calm twice a week. Up to 4-5  "per occasion. Then realizes can't sleep.   Exercise- working with son- - lifting weights. Yoga also- helpful.           Review of Systems   Constitutional, HEENT, cardiovascular, pulmonary, gi and gu systems are negative, except as otherwise noted.      Objective    /62   Pulse 82   Temp 98.2  F (36.8  C) (Temporal)   Resp 10   Ht 1.549 m (5' 1\")   Wt 69.9 kg (154 lb 1.6 oz)   LMP 12/10/2010 (Exact Date)   SpO2 99%   BMI 29.12 kg/m    Body mass index is 29.12 kg/m .  Physical Exam   GENERAL: healthy, alert and no distress  EYES: Eyes grossly normal to inspection, PERRL and conjunctivae and sclerae normal  HENT: ear canals and TM's normal, nose and mouth without ulcers or lesions  NECK: no adenopathy, no asymmetry, masses, or scars and thyroid normal to palpation  RESP: lungs clear to auscultation - no rales, rhonchi or wheezes  CV: regular rate and rhythm, normal S1 S2, no S3 or S4, no murmur, click or rub, no peripheral edema and peripheral pulses strong  ABDOMEN: soft, nontender, no hepatosplenomegaly, no masses and bowel sounds normal  MS: no gross musculoskeletal defects noted, no edema  NEURO: Normal strength and tone, mentation intact and speech normal  PSYCH: mentation appears normal, affect normal/bright          "

## 2022-06-08 ENCOUNTER — OFFICE VISIT (OUTPATIENT)
Dept: FAMILY MEDICINE | Facility: CLINIC | Age: 62
End: 2022-06-08
Payer: COMMERCIAL

## 2022-06-08 VITALS
DIASTOLIC BLOOD PRESSURE: 62 MMHG | BODY MASS INDEX: 29.09 KG/M2 | HEART RATE: 82 BPM | SYSTOLIC BLOOD PRESSURE: 118 MMHG | TEMPERATURE: 98.2 F | HEIGHT: 61 IN | RESPIRATION RATE: 10 BRPM | OXYGEN SATURATION: 99 % | WEIGHT: 154.1 LBS

## 2022-06-08 DIAGNOSIS — F41.1 GAD (GENERALIZED ANXIETY DISORDER): Primary | ICD-10-CM

## 2022-06-08 PROCEDURE — 99213 OFFICE O/P EST LOW 20 MIN: CPT | Performed by: PHYSICIAN ASSISTANT

## 2022-06-08 RX ORDER — LORATADINE 10 MG/1
10 TABLET ORAL DAILY
COMMUNITY
End: 2023-11-22

## 2022-06-08 ASSESSMENT — ANXIETY QUESTIONNAIRES
7. FEELING AFRAID AS IF SOMETHING AWFUL MIGHT HAPPEN: NOT AT ALL
3. WORRYING TOO MUCH ABOUT DIFFERENT THINGS: NEARLY EVERY DAY
IF YOU CHECKED OFF ANY PROBLEMS ON THIS QUESTIONNAIRE, HOW DIFFICULT HAVE THESE PROBLEMS MADE IT FOR YOU TO DO YOUR WORK, TAKE CARE OF THINGS AT HOME, OR GET ALONG WITH OTHER PEOPLE: SOMEWHAT DIFFICULT
GAD7 TOTAL SCORE: 9
5. BEING SO RESTLESS THAT IT IS HARD TO SIT STILL: NOT AT ALL
2. NOT BEING ABLE TO STOP OR CONTROL WORRYING: NEARLY EVERY DAY
6. BECOMING EASILY ANNOYED OR IRRITABLE: NOT AT ALL
1. FEELING NERVOUS, ANXIOUS, OR ON EDGE: NEARLY EVERY DAY
GAD7 TOTAL SCORE: 9

## 2022-06-08 ASSESSMENT — PAIN SCALES - GENERAL: PAINLEVEL: NO PAIN (0)

## 2022-06-08 ASSESSMENT — PATIENT HEALTH QUESTIONNAIRE - PHQ9
5. POOR APPETITE OR OVEREATING: NOT AT ALL
SUM OF ALL RESPONSES TO PHQ QUESTIONS 1-9: 6

## 2022-06-08 NOTE — PATIENT INSTRUCTIONS
You have been prescribed a medication to help with mood or anxiety: sertraline.    Take this medication once daily.     If instructed by your health care provider you may be advised to take a half tablet (half dose) daily for the first few days to reduce side effects and ease into a new medication.     Common side effects are nausea, headache, stomach upset or mild diarrhea. If you find it makes you sleepy, plan to take it at bedtime. Most of these side effects gradually improve over the first week of use.     It can take up to 2 weeks to notice initial benefits from the medication (ie increase in energy), and up to 4-6 weeks for other symptom improvement.     If you experience a worsening in mood or thoughts of self harm, seek help immediately.   Any Emergency Department if in crisis. After hours crisis line at [phone] or [phone]. [place] Crisis Text Line: Text [place] to 233161  or  Suicide LifeLine Chat: suicidepreventionPopbasicline.org/chat/    Non pharmacological treatment for depression and/or anxiety:  Counseling, Therapy or Cognitive Behavioral Therapy: In combination with medications, talking with a professional therapist can help reduce and improve symptoms of depression or anxiety. I can place a referral for you to arrange counseling through [facility]. You can also call your insurance to find covered therapists in your area. If the first person you see is not a good fit it is ok to try a different counselor.    Regular exercise reduces anxiety and depression symptoms. Being active 30-40 minutes per day 3-5 times per week can improve symptoms.    Reduce or avoid caffeine and alcohol. Caffeine can disrupt sleep and increase anxiety symptoms.  Alcohol has a depressive effect on the brain and works against the action of many medications.    Plan to follow up in 3-4 weeks.

## 2022-07-05 ASSESSMENT — ANXIETY QUESTIONNAIRES
GAD7 TOTAL SCORE: 0
7. FEELING AFRAID AS IF SOMETHING AWFUL MIGHT HAPPEN: NOT AT ALL
GAD7 TOTAL SCORE: 0
GAD7 TOTAL SCORE: 0
5. BEING SO RESTLESS THAT IT IS HARD TO SIT STILL: NOT AT ALL
8. IF YOU CHECKED OFF ANY PROBLEMS, HOW DIFFICULT HAVE THESE MADE IT FOR YOU TO DO YOUR WORK, TAKE CARE OF THINGS AT HOME, OR GET ALONG WITH OTHER PEOPLE?: NOT DIFFICULT AT ALL
4. TROUBLE RELAXING: NOT AT ALL
2. NOT BEING ABLE TO STOP OR CONTROL WORRYING: NOT AT ALL
6. BECOMING EASILY ANNOYED OR IRRITABLE: NOT AT ALL
1. FEELING NERVOUS, ANXIOUS, OR ON EDGE: NOT AT ALL
7. FEELING AFRAID AS IF SOMETHING AWFUL MIGHT HAPPEN: NOT AT ALL
3. WORRYING TOO MUCH ABOUT DIFFERENT THINGS: NOT AT ALL

## 2022-07-06 ENCOUNTER — OFFICE VISIT (OUTPATIENT)
Dept: FAMILY MEDICINE | Facility: CLINIC | Age: 62
End: 2022-07-06
Payer: COMMERCIAL

## 2022-07-06 VITALS
BODY MASS INDEX: 29.07 KG/M2 | DIASTOLIC BLOOD PRESSURE: 72 MMHG | TEMPERATURE: 97.3 F | RESPIRATION RATE: 10 BRPM | WEIGHT: 154 LBS | HEART RATE: 67 BPM | OXYGEN SATURATION: 99 % | HEIGHT: 61 IN | SYSTOLIC BLOOD PRESSURE: 116 MMHG

## 2022-07-06 DIAGNOSIS — F41.1 GAD (GENERALIZED ANXIETY DISORDER): Primary | ICD-10-CM

## 2022-07-06 PROCEDURE — 99213 OFFICE O/P EST LOW 20 MIN: CPT | Performed by: PHYSICIAN ASSISTANT

## 2022-07-06 ASSESSMENT — ANXIETY QUESTIONNAIRES: GAD7 TOTAL SCORE: 0

## 2022-07-06 NOTE — PROGRESS NOTES
Assessment & Plan     GISELA (generalized anxiety disorder)  Refilled at current dose. Tolerating well and beneficial.   Recheck in 6months.   Continue self cares.  - sertraline (ZOLOFT) 50 MG tablet; Take 1 tablet (50 mg) by mouth daily       Follow Up: The patient was instructed to contact clinic for worsening symptoms, non-improvement in time frame as expected/discussed, and for questions regarding medications or treatment plan. For virtual visits, the patient was advised to be seen for in person evaluation if symptoms or condition are worsening or non-improvement as expected.       Return in about 6 months (around 1/6/2023).    Berta Umanzor PA-C  [facility] TOM Avila is a 62 year old, presenting for the following health issues:  No chief complaint on file.      History of Present Illness       Mental Health Follow-up:  Patient presents to follow-up on Anxiety.    Patient's anxiety since last visit has been:  Better  The patient is not having other symptoms associated with anxiety.  Any significant life events: other  Patient is not feeling anxious or having panic attacks.  Patient has no concerns about alcohol or drug use.    She eats 2-3 servings of fruits and vegetables daily.She consumes 2 sweetened beverage(s) daily.She exercises with enough effort to increase her heart rate 30 to 60 minutes per day.  She exercises with enough effort to increase her heart rate 3 or less days per week.   She is taking medications regularly.  Today's GISELA-7 Score: 0     Anxiety   Last visit started sertraline  No side effects. Thinks tolerating well.   Finding it helpful. Less worry. Slows thoughts down, able to concentrate.   Able to relax.   Daughter and sister think she is doing a lot better.   Sleep - once in awhile lighter sleep .but mostly improved compared to last visit.   Energy has been ok.   Spouse is having his surgery for prostate cancer soon.   Trying to watch diet- rejoined  "weight watchers  Limits alcohol to 1 night per week - with spouse/social.  Exercise- weight training with her son 2 d per week.     PHQ 9/13/2021 3/9/2022 6/8/2022   PHQ-9 Total Score 0 0 6   Q9: Thoughts of better off dead/self-harm past 2 weeks Not at all Not at all Not at all     GISELA-7 SCORE 3/9/2022 6/8/2022 7/5/2022   Total Score 0 (minimal anxiety) - 0 (minimal anxiety)   Total Score 0 9 0           Review of Systems   Constitutional, HEENT, cardiovascular, pulmonary, gi and gu systems are negative, except as otherwise noted.      Objective    /72 (BP Location: Left arm, Patient Position: Sitting, Cuff Size: Adult Regular)   Pulse 67   Temp 97.3  F (36.3  C) (Temporal)   Resp 10   Ht 1.549 m (5' 0.98\")   Wt 69.9 kg (154 lb)   LMP 12/10/2010 (Exact Date)   SpO2 99%   BMI 29.12 kg/m    There is no height or weight on file to calculate BMI.  Physical Exam   GENERAL: healthy, alert and no distress  EYES: Eyes grossly normal to inspection, PERRL and conjunctivae and sclerae normal  HENT: ear canals and TM's normal, nose and mouth without ulcers or lesions  NECK: no adenopathy, no asymmetry, masses, or scars and thyroid normal to palpation  RESP: lungs clear to auscultation - no rales, rhonchi or wheezes  CV: regular rate and rhythm, normal S1 S2, no S3 or S4, no murmur, click or rub, no peripheral edema and peripheral pulses strong  ABDOMEN: soft, nontender, no hepatosplenomegaly, no masses and bowel sounds normal  MS: no gross musculoskeletal defects noted, no edema  NEURO: Normal strength and tone, mentation intact and speech normal  PSYCH: mentation appears normal, affect normal/bright                .  ..  "

## 2022-10-10 ENCOUNTER — HEALTH MAINTENANCE LETTER (OUTPATIENT)
Age: 62
End: 2022-10-10

## 2023-01-17 ASSESSMENT — ANXIETY QUESTIONNAIRES
7. FEELING AFRAID AS IF SOMETHING AWFUL MIGHT HAPPEN: NOT AT ALL
2. NOT BEING ABLE TO STOP OR CONTROL WORRYING: SEVERAL DAYS
8. IF YOU CHECKED OFF ANY PROBLEMS, HOW DIFFICULT HAVE THESE MADE IT FOR YOU TO DO YOUR WORK, TAKE CARE OF THINGS AT HOME, OR GET ALONG WITH OTHER PEOPLE?: SOMEWHAT DIFFICULT
5. BEING SO RESTLESS THAT IT IS HARD TO SIT STILL: NOT AT ALL
6. BECOMING EASILY ANNOYED OR IRRITABLE: SEVERAL DAYS
4. TROUBLE RELAXING: NOT AT ALL
7. FEELING AFRAID AS IF SOMETHING AWFUL MIGHT HAPPEN: NOT AT ALL
GAD7 TOTAL SCORE: 3
IF YOU CHECKED OFF ANY PROBLEMS ON THIS QUESTIONNAIRE, HOW DIFFICULT HAVE THESE PROBLEMS MADE IT FOR YOU TO DO YOUR WORK, TAKE CARE OF THINGS AT HOME, OR GET ALONG WITH OTHER PEOPLE: SOMEWHAT DIFFICULT
GAD7 TOTAL SCORE: 3
1. FEELING NERVOUS, ANXIOUS, OR ON EDGE: NOT AT ALL
GAD7 TOTAL SCORE: 3
3. WORRYING TOO MUCH ABOUT DIFFERENT THINGS: SEVERAL DAYS

## 2023-01-18 ENCOUNTER — OFFICE VISIT (OUTPATIENT)
Dept: FAMILY MEDICINE | Facility: CLINIC | Age: 63
End: 2023-01-18
Payer: COMMERCIAL

## 2023-01-18 VITALS
RESPIRATION RATE: 17 BRPM | BODY MASS INDEX: 31.15 KG/M2 | HEIGHT: 61 IN | OXYGEN SATURATION: 97 % | DIASTOLIC BLOOD PRESSURE: 70 MMHG | TEMPERATURE: 97.4 F | HEART RATE: 82 BPM | SYSTOLIC BLOOD PRESSURE: 118 MMHG | WEIGHT: 165 LBS

## 2023-01-18 DIAGNOSIS — F41.1 GAD (GENERALIZED ANXIETY DISORDER): Primary | ICD-10-CM

## 2023-01-18 DIAGNOSIS — Z23 NEED FOR VACCINATION: ICD-10-CM

## 2023-01-18 DIAGNOSIS — Z12.31 VISIT FOR SCREENING MAMMOGRAM: ICD-10-CM

## 2023-01-18 PROCEDURE — 90472 IMMUNIZATION ADMIN EACH ADD: CPT | Performed by: PHYSICIAN ASSISTANT

## 2023-01-18 PROCEDURE — 90750 HZV VACC RECOMBINANT IM: CPT | Performed by: PHYSICIAN ASSISTANT

## 2023-01-18 PROCEDURE — 96127 BRIEF EMOTIONAL/BEHAV ASSMT: CPT | Performed by: PHYSICIAN ASSISTANT

## 2023-01-18 PROCEDURE — 90471 IMMUNIZATION ADMIN: CPT | Performed by: PHYSICIAN ASSISTANT

## 2023-01-18 PROCEDURE — 99213 OFFICE O/P EST LOW 20 MIN: CPT | Mod: 25 | Performed by: PHYSICIAN ASSISTANT

## 2023-01-18 PROCEDURE — 90682 RIV4 VACC RECOMBINANT DNA IM: CPT | Performed by: PHYSICIAN ASSISTANT

## 2023-01-18 ASSESSMENT — PATIENT HEALTH QUESTIONNAIRE - PHQ9: SUM OF ALL RESPONSES TO PHQ QUESTIONS 1-9: 2

## 2023-01-18 ASSESSMENT — ANXIETY QUESTIONNAIRES: GAD7 TOTAL SCORE: 3

## 2023-01-18 ASSESSMENT — PAIN SCALES - GENERAL: PAINLEVEL: NO PAIN (0)

## 2023-01-18 NOTE — NURSING NOTE
Prior to immunization administration, verified patients identity using patient s name and date of birth. Please see Immunization Activity for additional information.     Screening Questionnaire for Adult Immunization    Are you sick today?   No   Do you have allergies to medications, food, a vaccine component or latex?   Yes   Have you ever had a serious reaction after receiving a vaccination?   No   Do you have a long-term health problem with heart, lung, kidney, or metabolic disease (e.g., diabetes), asthma, a blood disorder, no spleen, complement component deficiency, a cochlear implant, or a spinal fluid leak?  Are you on long-term aspirin therapy?   No   Do you have cancer, leukemia, HIV/AIDS, or any other immune system problem?   No   Do you have a parent, brother, or sister with an immune system problem?   No   In the past 3 months, have you taken medications that affect  your immune system, such as prednisone, other steroids, or anticancer drugs; drugs for the treatment of rheumatoid arthritis, Crohn s disease, or psoriasis; or have you had radiation treatments?   No   Have you had a seizure, or a brain or other nervous system problem?   No   During the past year, have you received a transfusion of blood or blood    products, or been given immune (gamma) globulin or antiviral drug?   No   For women: Are you pregnant or is there a chance you could become       pregnant during the next month?   No   Have you received any vaccinations in the past 4 weeks?   No     Immunization questionnaire was positive for at least one answer.  Notified known drug allergy.        Patient instructed to remain in clinic for 15 minutes afterwards, and to report any adverse reaction to me immediately.       Screening performed by Pily Christianson on 1/18/2023 at 10:23 AM.

## 2023-01-18 NOTE — PATIENT INSTRUCTIONS
Due for pap and annual before Oct 2023. (End of summer or early fall plan for physical and pap)     Refilled sertraline for you.     Vaccines today     To schedule your Imaging Test or Specialty Referral please call: Mammogram   [facility]   Radiology (imaging- mammogram, ultrasound, CT, MRI): [phone]  Speciality consultation [phone] [address]  [facility] [address]

## 2023-01-18 NOTE — PROGRESS NOTES
"  Assessment & Plan     GISELA (generalized anxiety disorder)  Doing well with sertraline and her healthy lifestyle habits.   Refilled at current dose.   - sertraline (ZOLOFT) 50 MG tablet; Take 1 tablet (50 mg) by mouth daily    Visit for screening mammogram  Due for mammogram. Ordered and gave pt info to schedule.   - MA SCREENING DIGITAL BILAT - Future  (s+30); Future    Need for vaccination  Given   - ZOSTER VACCINE RECOMBINANT ADJUVANTED (SHINGRIX)  - INFLUENZA VACCINE 50-64 OR 18-64 W/EGG ALLERGY (FLUBLOK)     BMI:   Estimated body mass index is 30.92 kg/m  as calculated from the following:    Height as of this encounter: 1.556 m (5' 1.25\").    Weight as of this encounter: 74.8 kg (165 lb).   Weight management plan: Discussed healthy diet and exercise guidelines    Follow Up: The patient was instructed to contact clinic for worsening symptoms, non-improvement in time frame as expected/discussed, and for questions regarding medications or treatment plan. For virtual visits, the patient was advised to be seen for in person evaluation if symptoms or condition are worsening or non-improvement as expected.       Return in about 6 months (around 7/18/2023) for Physical Exam.    Berta Umanzor PA-C  [facility] TOM Avila is a 63 year old, presenting for the following health issues:  Anxiety      History of Present Illness       Mental Health Follow-up:  Patient presents to follow-up on Anxiety.    Patient's anxiety since last visit has been:  Better  The patient is not having other symptoms associated with anxiety.  Any significant life events: other  Patient is not feeling anxious or having panic attacks.  Patient has no concerns about alcohol or drug use.    She eats 2-3 servings of fruits and vegetables daily.She consumes 0 sweetened beverage(s) daily.She exercises with enough effort to increase her heart rate 10 to 19 minutes per day.  She exercises with enough effort to increase " "her heart rate 3 or less days per week. She is missing 1 dose(s) of medications per week.  She is not taking prescribed medications regularly due to remembering to take.  Today's GISELA-7 Score: 3     Vaccines- will do flu shot and would like the shingles vaccine here today.     Anxiety Follow-Up- sertraline 50mg daily  Doing well on sertrlaine. I am doing a lot better than I was. Plan of medications and below:   Whole food diet and more fruits/vegettables and whole grains  Doing Ayurveda for stress relief, meditation, massage, breathing and yoga.   Lifting weights, walking for exercise daily. Training with son- 2d per week.   Limiting going out to 1x per week. Following weight watchers for meals out- eats half and avoids foods   Limits alcohol  Sleeping really well. \"once on rare occasion\" not sleeping well.   Her sister's   recently - [place] and [place].    had prostate cancer- had surgery.     How are you doing with your anxiety since your last visit? stable    Are you having other symptoms that might be associated with anxiety? No    Have you had a significant life event? OTHER:  had cancer and family member living with her, brother in law passed away     Are you feeling depressed? No    Do you have any concerns with your use of alcohol or other drugs? No    Social History     Tobacco Use     Smoking status: Never     Passive exposure: Never     Smokeless tobacco: Never   Vaping Use     Vaping Use: Never used   Substance Use Topics     Alcohol use: Yes     Comment: occasional- 2-3d per week- 2-3 beers     Drug use: Never     GISELA-7 SCORE 2022   Total Score - 0 (minimal anxiety) 3 (minimal anxiety)   Total Score 9 0 3     PHQ 3/9/2022 2022 2023   PHQ-9 Total Score 0 6 2   Q9: Thoughts of better off dead/self-harm past 2 weeks Not at all Not at all Not at all       Review of Systems   Constitutional, HEENT, cardiovascular, pulmonary, gi and " "gu systems are negative, except as otherwise noted.      Objective    /70 (BP Location: Left arm, Patient Position: Chair, Cuff Size: Adult Regular)   Pulse 82   Temp 97.4  F (36.3  C) (Temporal)   Resp 17   Ht 1.556 m (5' 1.25\")   Wt 74.8 kg (165 lb)   LMP 12/10/2010 (Exact Date)   SpO2 97%   Breastfeeding No   BMI 30.92 kg/m    Body mass index is 30.92 kg/m .  Physical Exam   GENERAL: healthy, alert and no distress  EYES: Eyes grossly normal to inspection, PERRL and conjunctivae and sclerae normal  HENT: ear canals and TM's normal, nose and mouth without ulcers or lesions  NECK: no adenopathy, no asymmetry, masses, or scars and thyroid normal to palpation  RESP: lungs clear to auscultation - no rales, rhonchi or wheezes  CV: regular rate and rhythm, normal S1 S2, no S3 or S4, no murmur, click or rub, no peripheral edema and peripheral pulses strong  ABDOMEN: soft, nontender, no hepatosplenomegaly, no masses and bowel sounds normal  MS: no gross musculoskeletal defects noted, no edema  NEURO: Normal strength and tone, mentation intact and speech normal  PSYCH: mentation appears normal, affect normal/bright                    "

## 2023-03-14 ENCOUNTER — ANCILLARY PROCEDURE (OUTPATIENT)
Dept: MAMMOGRAPHY | Facility: CLINIC | Age: 63
End: 2023-03-14
Attending: PHYSICIAN ASSISTANT
Payer: COMMERCIAL

## 2023-03-14 DIAGNOSIS — Z12.31 VISIT FOR SCREENING MAMMOGRAM: ICD-10-CM

## 2023-03-14 PROCEDURE — 77063 BREAST TOMOSYNTHESIS BI: CPT | Mod: TC | Performed by: RADIOLOGY

## 2023-03-14 PROCEDURE — 77067 SCR MAMMO BI INCL CAD: CPT | Mod: TC | Performed by: RADIOLOGY

## 2023-03-25 ENCOUNTER — HEALTH MAINTENANCE LETTER (OUTPATIENT)
Age: 63
End: 2023-03-25

## 2023-07-07 DIAGNOSIS — F41.1 GAD (GENERALIZED ANXIETY DISORDER): ICD-10-CM

## 2023-07-10 NOTE — TELEPHONE ENCOUNTER
Radhika    Appointments in Next Year    Jul 17, 2023  9:30 AM  (Arrive by 9:10 AM)  Preventative Adult Visit with Berta Umanzor PA-C  [facility] ([facility] ) [phone]

## 2023-07-16 ASSESSMENT — ENCOUNTER SYMPTOMS
EYE PAIN: 0
PALPITATIONS: 0
MYALGIAS: 0
HEMATURIA: 0
HEADACHES: 0
SORE THROAT: 0
ABDOMINAL PAIN: 0
CONSTIPATION: 0
FEVER: 0
HEMATOCHEZIA: 0
DYSURIA: 0
PARESTHESIAS: 0
NAUSEA: 0
BREAST MASS: 0
NERVOUS/ANXIOUS: 0
WEAKNESS: 0
FREQUENCY: 0
DIZZINESS: 0
HEARTBURN: 0
ARTHRALGIAS: 0
SHORTNESS OF BREATH: 0
DIARRHEA: 0
COUGH: 0
JOINT SWELLING: 0
CHILLS: 0

## 2023-07-17 ENCOUNTER — OFFICE VISIT (OUTPATIENT)
Dept: FAMILY MEDICINE | Facility: CLINIC | Age: 63
End: 2023-07-17
Payer: COMMERCIAL

## 2023-07-17 VITALS
HEART RATE: 82 BPM | TEMPERATURE: 97.8 F | WEIGHT: 167.6 LBS | OXYGEN SATURATION: 98 % | SYSTOLIC BLOOD PRESSURE: 110 MMHG | HEIGHT: 61 IN | RESPIRATION RATE: 16 BRPM | BODY MASS INDEX: 31.64 KG/M2 | DIASTOLIC BLOOD PRESSURE: 70 MMHG

## 2023-07-17 DIAGNOSIS — F41.1 GAD (GENERALIZED ANXIETY DISORDER): ICD-10-CM

## 2023-07-17 DIAGNOSIS — Z12.4 CERVICAL CANCER SCREENING: ICD-10-CM

## 2023-07-17 DIAGNOSIS — Z00.00 ROUTINE GENERAL MEDICAL EXAMINATION AT A HEALTH CARE FACILITY: Primary | ICD-10-CM

## 2023-07-17 LAB
ALBUMIN SERPL BCG-MCNC: 4.3 G/DL (ref 3.5–5.2)
ALP SERPL-CCNC: 90 U/L (ref 35–104)
ALT SERPL W P-5'-P-CCNC: 12 U/L (ref 0–50)
ANION GAP SERPL CALCULATED.3IONS-SCNC: 10 MMOL/L (ref 7–15)
AST SERPL W P-5'-P-CCNC: 24 U/L (ref 0–45)
BILIRUB SERPL-MCNC: 0.4 MG/DL
BUN SERPL-MCNC: 19 MG/DL (ref 8–23)
CALCIUM SERPL-MCNC: 9.5 MG/DL (ref 8.8–10.2)
CHLORIDE SERPL-SCNC: 105 MMOL/L (ref 98–107)
CREAT SERPL-MCNC: 0.75 MG/DL (ref 0.51–0.95)
DEPRECATED HCO3 PLAS-SCNC: 26 MMOL/L (ref 22–29)
GFR SERPL CREATININE-BSD FRML MDRD: 90 ML/MIN/1.73M2
GLUCOSE SERPL-MCNC: 95 MG/DL (ref 70–99)
HBA1C MFR BLD: 5.5 % (ref 0–5.6)
POTASSIUM SERPL-SCNC: 4.7 MMOL/L (ref 3.4–5.3)
PROT SERPL-MCNC: 7.4 G/DL (ref 6.4–8.3)
SODIUM SERPL-SCNC: 141 MMOL/L (ref 136–145)

## 2023-07-17 PROCEDURE — 36415 COLL VENOUS BLD VENIPUNCTURE: CPT | Performed by: PHYSICIAN ASSISTANT

## 2023-07-17 PROCEDURE — 80053 COMPREHEN METABOLIC PANEL: CPT | Performed by: PHYSICIAN ASSISTANT

## 2023-07-17 PROCEDURE — G0145 SCR C/V CYTO,THINLAYER,RESCR: HCPCS | Performed by: PHYSICIAN ASSISTANT

## 2023-07-17 PROCEDURE — 99396 PREV VISIT EST AGE 40-64: CPT | Performed by: PHYSICIAN ASSISTANT

## 2023-07-17 PROCEDURE — 83036 HEMOGLOBIN GLYCOSYLATED A1C: CPT | Performed by: PHYSICIAN ASSISTANT

## 2023-07-17 PROCEDURE — 87624 HPV HI-RISK TYP POOLED RSLT: CPT | Performed by: PHYSICIAN ASSISTANT

## 2023-07-17 PROCEDURE — 99213 OFFICE O/P EST LOW 20 MIN: CPT | Mod: 25 | Performed by: PHYSICIAN ASSISTANT

## 2023-07-17 RX ORDER — SERTRALINE HYDROCHLORIDE 100 MG/1
100 TABLET, FILM COATED ORAL DAILY
Qty: 90 TABLET | Refills: 1 | Status: SHIPPED | OUTPATIENT
Start: 2023-07-17 | End: 2023-11-22

## 2023-07-17 ASSESSMENT — ENCOUNTER SYMPTOMS
NAUSEA: 0
EYE PAIN: 0
FEVER: 0
BREAST MASS: 0
DIZZINESS: 0
WEAKNESS: 0
SHORTNESS OF BREATH: 0
HEMATURIA: 0
HEMATOCHEZIA: 0
CHILLS: 0
PALPITATIONS: 0
DIARRHEA: 0
MYALGIAS: 0
HEARTBURN: 0
CONSTIPATION: 0
PARESTHESIAS: 0
JOINT SWELLING: 0
HEADACHES: 0
DYSURIA: 0
NERVOUS/ANXIOUS: 0
SORE THROAT: 0
FREQUENCY: 0
ABDOMINAL PAIN: 0
ARTHRALGIAS: 0
COUGH: 0

## 2023-07-17 ASSESSMENT — PAIN SCALES - GENERAL: PAINLEVEL: NO PAIN (0)

## 2023-07-17 NOTE — PATIENT INSTRUCTIONS
Increase the sertraline to 100mg daily    Preventive Health Recommendations  Female Ages 50 - 64    Yearly exam: See your health care provider every year in order to  Review health changes.   Discuss preventive care.    Review your medicines if your doctor has prescribed any.    Get a Pap test every three years (unless you have an abnormal result and your provider advises testing more often).  If you get Pap tests with HPV test, you only need to test every 5 years, unless you have an abnormal result.   You do not need a Pap test if your uterus was removed (hysterectomy) and you have not had cancer.  You should be tested each year for STDs (sexually transmitted diseases) if you're at risk.   Have a mammogram every 1 to 2 years.  Have a colonoscopy at age 50, or have a yearly FIT test (stool test). These exams screen for colon cancer.    Have a cholesterol test every 5 years, or more often if advised.  Have a diabetes test (fasting glucose) every three years. If you are at risk for diabetes, you should have this test more often.   If you are at risk for osteoporosis (brittle bone disease), think about having a bone density scan (DEXA).    Shots: Get a flu shot each year. Get a tetanus shot every 10 years.    Nutrition:   Eat at least 5 servings of fruits and vegetables each day.  Eat whole-grain bread, whole-wheat pasta and brown rice instead of white grains and rice.  Get adequate Calcium and Vitamin D.     Lifestyle  Exercise at least 150 minutes a week (30 minutes a day, 5 days a week). This will help you control your weight and prevent disease.  Limit alcohol to one drink per day.  No smoking.   Wear sunscreen to prevent skin cancer.   See your dentist every six months for an exam and cleaning.  See your eye doctor every 1 to 2 years.

## 2023-07-17 NOTE — PROGRESS NOTES
SUBJECTIVE:   CC: Margarita is an 63 year old who presents for preventive health visit.       7/17/2023     9:05 AM   Additional Questions   Roomed by Risa DEGROOT CMA   Accompanied by Self         7/17/2023     9:05 AM   Patient Reported Additional Medications   Patient reports taking the following new medications n/a     Healthy Habits:     Getting at least 3 servings of Calcium per day:  Yes    Bi-annual eye exam:  Yes    Dental care twice a year:  Yes    Sleep apnea or symptoms of sleep apnea:  None    Diet:  Other    Frequency of exercise:  4-5 days/week    Duration of exercise:  45-60 minutes    Taking medications regularly:  Yes    Medication side effects:  None    Routine Health Maintenance:  Immunizations Due For: covid booster   Mammogram: last 03/14/2023.  Colonoscopy: 07/22/2016. F/U in 10 yrs.     Fasting: NO- ate morning shake (beets, blueberries, strawberries)   Lipids screening: normal 03/2022 - LDL 77, , TC 1959, Trig 67.   Diabetes screening: normal prior . Diabetes in mother and mat gma.     [facility]N H[facility]: [facility]  Pap: Last: 2020. Always normal.   Menopause: since 50s. No bleeding.   Sexually active: no. Spouse w/prostate cancer- s/p prostatectomy and has to start radiation after their vacation.   Denies GYN concerns of PCB, pelvic pain, dyspareunia, vaginal discharge    Working on active lifestyle. 14 miles walking total last week. Weight training 2d per week for 60min. Then walking at the gym treadmill 2d per week. Feels good with exercise. Limitations with exercise due to: nothing. Denies CV sxs with exercise including CP, SOB, palpitations, GIL, presyncope.    Anxiety- sertraline 50mg daily  I think I am doing ok but thinks a higher dose may help. My mind jumps and can be hard to focus. Sleep can be hard to initiate- mind is busy. Initiates better when has had a good exercise day. No side effects.       Have you ever done Advance Care Planning?  (For example, a Health Directive, POLST, or a discussion with a medical provider or your loved ones about your wishes): No Advanced care planning, would like information on this today.     Social History     Tobacco Use     Smoking status: Never     Passive exposure: Never     Smokeless tobacco: Never   Substance Use Topics     Alcohol use: Yes     Comment: Sometimes           7/16/2023     4:42 PM   Alcohol Use   Prescreen: >3 drinks/day or >7 drinks/week? No          No data to display              Reviewed orders with patient.  Reviewed health maintenance and updated orders accordingly - Yes  Lab work is in process  Labs reviewed in EPIC  BP Readings from Last 3 Encounters:   07/17/23 110/70   01/18/23 118/70   07/06/22 116/72    Wt Readings from Last 3 Encounters:   07/17/23 76 kg (167 lb 9.6 oz)   01/18/23 74.8 kg (165 lb)   07/06/22 69.9 kg (154 lb)                  Patient Active Problem List   Diagnosis     CARDIOVASCULAR SCREENING; LDL GOAL LESS THAN 160     Allergic rhinitis     GISELA (generalized anxiety disorder)     Menometrorrhagia     Recurrent sinusitis     Past Surgical History:   Procedure Laterality Date     BIOPSY  10/2020    Fibroid removal at yearly at [facility] [place]     COLONOSCOPY WITH CO2 INSUFFLATION N/A 07/22/2016    Procedure: COLONOSCOPY WITH CO2 INSUFFLATION;  Surgeon: Duane, William Charles, MD;  Location:  OR     No surgical history         Social History     Tobacco Use     Smoking status: Never     Passive exposure: Never     Smokeless tobacco: Never   Substance Use Topics     Alcohol use: Yes     Comment: Sometimes     Family History   Problem Relation Age of Onset     Diabetes Mother      Diabetes Maternal Grandmother      Breast Cancer Paternal Aunt          Current Outpatient Medications   Medication Sig Dispense Refill     Flaxseed, Linseed, (FLAXSEED OIL PO)        Probiotic Product (PROBIOTIC PO)        sertraline (ZOLOFT) 100 MG tablet Take 1 tablet (100 mg) by mouth  daily 90 tablet 1     CRANBERRY PO        famotidine (PEPCID) 20 MG tablet Take 1 tablet (20 mg) by mouth 2 times daily (Patient not taking: No sig reported) 180 tablet 0     loratadine (CLARITIN) 10 MG tablet Take 10 mg by mouth daily       Allergies   Allergen Reactions     Other [No Clinical Screening - See Comments]      Something for a yeast infection     Spinach Nausea and Vomiting       Breast Cancer Screening:    FHS-7:       3/14/2023     2:04 PM 7/16/2023     4:48 PM   Breast CA Risk Assessment (FHS-7)   Did any of your first-degree relatives have breast or ovarian cancer? No Yes   Did any of your relatives have bilateral breast cancer? No Unknown   Did any man in your family have breast cancer? No No   Did any woman in your family have breast and ovarian cancer? No No   Did any woman in your family have breast cancer before age 50 y? No Unknown   Do you have 2 or more relatives with breast and/or ovarian cancer? No No   Do you have 2 or more relatives with breast and/or bowel cancer? No No       Mammogram Screening: Recommended annual mammography  Pertinent mammograms are reviewed under the imaging tab.    History of abnormal Pap smear:       3/6/2012     9:26 AM 12/20/2010    12:00 AM   PAP / HPV   PAP (Historical) NIL  NIL      Reviewed and updated as needed this visit by clinical staff   Tobacco  Allergies  Meds              Reviewed and updated as needed this visit by Provider                     Review of Systems   Constitutional: Negative for chills and fever.   HENT: Negative for congestion, ear pain, hearing loss and sore throat.    Eyes: Negative for pain and visual disturbance.   Respiratory: Negative for cough and shortness of breath.    Cardiovascular: Negative for chest pain, palpitations and peripheral edema.   Gastrointestinal: Negative for abdominal pain, constipation, diarrhea, heartburn, hematochezia and nausea.   Breasts:  Negative for tenderness, breast mass and discharge.  "  Genitourinary: Negative for dysuria, frequency, genital sores, hematuria, pelvic pain, urgency, vaginal bleeding and vaginal discharge.   Musculoskeletal: Negative for arthralgias, joint swelling and myalgias.   Skin: Negative for rash.   Neurological: Negative for dizziness, weakness, headaches and paresthesias.   Psychiatric/Behavioral: Negative for mood changes. The patient is not nervous/anxious.         OBJECTIVE:   /70   Pulse 82   Temp 97.8  F (36.6  C) (Temporal)   Resp 16   Ht 1.555 m (5' 1.22\")   Wt 76 kg (167 lb 9.6 oz)   LMP 12/10/2010 (Exact Date)   SpO2 98%   BMI 31.44 kg/m    Physical Exam  GENERAL APPEARANCE: healthy, alert and no distress  EYES: Eyes grossly normal to inspection, PERRL and conjunctivae and sclerae normal  HENT: ear canals and TM's normal, nose and mouth without ulcers or lesions, oropharynx clear and oral mucous membranes moist  NECK: no adenopathy, no asymmetry, masses, or scars and thyroid normal to palpation  RESP: lungs clear to auscultation - no rales, rhonchi or wheezes  BREAST: normal without masses, tenderness or nipple discharge and no palpable axillary masses or adenopathy  CV: regular rate and rhythm, normal S1 S2, no S3 or S4, no murmur, click or rub, no peripheral edema and peripheral pulses strong  ABDOMEN: soft, nontender, no hepatosplenomegaly, no masses and bowel sounds normal   (female): normal female external genitalia, normal urethral meatus, vaginal mucosal atrophy noted, normal cervix, adnexae, and uterus without masses or abnormal discharge  MS: no musculoskeletal defects are noted and gait is age appropriate without ataxia  SKIN: no suspicious lesions or rashes  NEURO: Normal strength and tone, sensory exam grossly normal, mentation intact and speech normal  PSYCH: mentation appears normal and affect normal/bright    Diagnostic Test Results:  Labs reviewed in Epic  No results found for any visits on 07/17/23.    ASSESSMENT/PLAN:       " ICD-10-CM    1. Routine general medical examination at a health care facility  Z00.00 REVIEW OF HEALTH MAINTENANCE PROTOCOL ORDERS     Pap Screen with HPV - recommended age 30 - 65 years     Hemoglobin A1c     Comprehensive metabolic panel (BMP + Alb, Alk Phos, ALT, AST, Total. Bili, TP)      2. Cervical cancer screening  Z12.4 Pap Screen with HPV - recommended age 30 - 65 years      3. GISELA (generalized anxiety disorder)  F41.1 sertraline (ZOLOFT) 100 MG tablet        1. Routine general medical examination at a health care facility  Reviewed VS and weight/BMI with pt   Immunizations: discussed and pt declined covid booster  Discussed with some insurance plans vaccines are covered preferably through pharmacy (ie Shingrix)  Counseling as below   Health screenings/maintenance per orders/comments below  When applicable based on age, personal hx, fam hx, and RF- counseled on appropriate cancer screenings.   Heart healthy exercise and eating habits discussed    - REVIEW OF HEALTH MAINTENANCE PROTOCOL ORDERS  - Pap Screen with HPV - recommended age 30 - 65 years  - Hemoglobin A1c; Future  - Comprehensive metabolic panel (BMP + Alb, Alk Phos, ALT, AST, Total. Bili, TP); Future    2. Cervical cancer screening  Obtained . Follow up per management guidelines  - Pap Screen with HPV - recommended age 30 - 65 years    3. GISELA (generalized anxiety disorder)  Tolerating well. Increasing dose from 50mg to 100mg daily. Recheck in 3-6mo. Sooner if needed.   - sertraline (ZOLOFT) 100 MG tablet; Take 1 tablet (100 mg) by mouth daily  Dispense: 90 tablet; Refill: 1      Patient has been advised of split billing requirements and indicates understanding: Yes      COUNSELING:  Reviewed preventive health counseling, as reflected in patient instructions       Regular exercise       Healthy diet/nutrition       Osteoporosis prevention/bone health      BMI:   Estimated body mass index is 31.44 kg/m  as calculated from the following:    Height as  "of this encounter: 1.555 m (5' 1.22\").    Weight as of this encounter: 76 kg (167 lb 9.6 oz).   Weight management plan: Discussed healthy diet and exercise guidelines      She reports that she has never smoked. She has never been exposed to tobacco smoke. She has never used smokeless tobacco.          [facility] TOM  "

## 2023-07-20 LAB
BKR LAB AP GYN ADEQUACY: NORMAL
BKR LAB AP GYN INTERPRETATION: NORMAL
BKR LAB AP HPV REFLEX: NORMAL
BKR LAB AP PREVIOUS ABNORMAL: NORMAL
PATH REPORT.COMMENTS IMP SPEC: NORMAL
PATH REPORT.COMMENTS IMP SPEC: NORMAL
PATH REPORT.RELEVANT HX SPEC: NORMAL

## 2023-07-21 LAB
HUMAN PAPILLOMA VIRUS 16 DNA: NEGATIVE
HUMAN PAPILLOMA VIRUS 18 DNA: NEGATIVE
HUMAN PAPILLOMA VIRUS FINAL DIAGNOSIS: NORMAL
HUMAN PAPILLOMA VIRUS OTHER HR: NEGATIVE

## 2023-11-22 ENCOUNTER — OFFICE VISIT (OUTPATIENT)
Dept: FAMILY MEDICINE | Facility: CLINIC | Age: 63
End: 2023-11-22
Payer: COMMERCIAL

## 2023-11-22 VITALS
BODY MASS INDEX: 32.47 KG/M2 | WEIGHT: 172 LBS | OXYGEN SATURATION: 97 % | SYSTOLIC BLOOD PRESSURE: 126 MMHG | HEART RATE: 79 BPM | HEIGHT: 61 IN | TEMPERATURE: 98.6 F | RESPIRATION RATE: 16 BRPM | DIASTOLIC BLOOD PRESSURE: 68 MMHG

## 2023-11-22 DIAGNOSIS — Z23 NEED FOR PROPHYLACTIC VACCINATION AND INOCULATION AGAINST INFLUENZA: ICD-10-CM

## 2023-11-22 DIAGNOSIS — F41.1 GAD (GENERALIZED ANXIETY DISORDER): Primary | ICD-10-CM

## 2023-11-22 PROCEDURE — 90682 RIV4 VACC RECOMBINANT DNA IM: CPT | Performed by: PHYSICIAN ASSISTANT

## 2023-11-22 PROCEDURE — 96127 BRIEF EMOTIONAL/BEHAV ASSMT: CPT | Performed by: PHYSICIAN ASSISTANT

## 2023-11-22 PROCEDURE — 99213 OFFICE O/P EST LOW 20 MIN: CPT | Mod: 25 | Performed by: PHYSICIAN ASSISTANT

## 2023-11-22 PROCEDURE — 90471 IMMUNIZATION ADMIN: CPT | Performed by: PHYSICIAN ASSISTANT

## 2023-11-22 RX ORDER — SERTRALINE HYDROCHLORIDE 100 MG/1
100 TABLET, FILM COATED ORAL DAILY
Qty: 90 TABLET | Refills: 1 | Status: SHIPPED | OUTPATIENT
Start: 2023-11-22

## 2023-11-22 ASSESSMENT — ANXIETY QUESTIONNAIRES
4. TROUBLE RELAXING: NOT AT ALL
7. FEELING AFRAID AS IF SOMETHING AWFUL MIGHT HAPPEN: NOT AT ALL
6. BECOMING EASILY ANNOYED OR IRRITABLE: NOT AT ALL
IF YOU CHECKED OFF ANY PROBLEMS ON THIS QUESTIONNAIRE, HOW DIFFICULT HAVE THESE PROBLEMS MADE IT FOR YOU TO DO YOUR WORK, TAKE CARE OF THINGS AT HOME, OR GET ALONG WITH OTHER PEOPLE: NOT DIFFICULT AT ALL
GAD7 TOTAL SCORE: 0
5. BEING SO RESTLESS THAT IT IS HARD TO SIT STILL: NOT AT ALL
1. FEELING NERVOUS, ANXIOUS, OR ON EDGE: NOT AT ALL
3. WORRYING TOO MUCH ABOUT DIFFERENT THINGS: NOT AT ALL
GAD7 TOTAL SCORE: 0
2. NOT BEING ABLE TO STOP OR CONTROL WORRYING: NOT AT ALL

## 2023-11-22 ASSESSMENT — PAIN SCALES - GENERAL: PAINLEVEL: NO PAIN (0)

## 2023-11-22 NOTE — PATIENT INSTRUCTIONS
Glad you are doing well!   Keep up the good exercise and weight training habits  Stay the course with the sertaline.     Flu shot today    Get the RSV vaccine at your pharmacy

## 2024-01-15 ENCOUNTER — OFFICE VISIT (OUTPATIENT)
Dept: URGENT CARE | Facility: URGENT CARE | Age: 64
End: 2024-01-15

## 2024-01-15 ENCOUNTER — ANCILLARY PROCEDURE (OUTPATIENT)
Dept: GENERAL RADIOLOGY | Facility: CLINIC | Age: 64
End: 2024-01-15
Attending: EMERGENCY MEDICINE

## 2024-01-15 VITALS
RESPIRATION RATE: 20 BRPM | TEMPERATURE: 98.6 F | HEART RATE: 97 BPM | OXYGEN SATURATION: 97 % | BODY MASS INDEX: 32.23 KG/M2 | DIASTOLIC BLOOD PRESSURE: 60 MMHG | WEIGHT: 171.6 LBS | SYSTOLIC BLOOD PRESSURE: 128 MMHG

## 2024-01-15 DIAGNOSIS — S99.921A INJURY OF RIGHT FOOT, INITIAL ENCOUNTER: ICD-10-CM

## 2024-01-15 DIAGNOSIS — S92.901A CLOSED FRACTURE OF RIGHT FOOT, INITIAL ENCOUNTER: Primary | ICD-10-CM

## 2024-01-15 PROCEDURE — 73630 X-RAY EXAM OF FOOT: CPT | Mod: TC | Performed by: RADIOLOGY

## 2024-01-15 PROCEDURE — 99214 OFFICE O/P EST MOD 30 MIN: CPT | Performed by: EMERGENCY MEDICINE

## 2024-01-15 ASSESSMENT — PAIN SCALES - GENERAL: PAINLEVEL: MODERATE PAIN (4)

## 2024-01-15 NOTE — PROGRESS NOTES
Assessment & Plan     Diagnosis:    ICD-10-CM    1. Closed fracture of right foot, initial encounter  S92.901A XR Foot Right G/E 3 Views     Ankle/Foot Bracing Supplies Order Walking Boot; Right; Pneumatic; Short     Crutches Order for DME - ONLY FOR DME     Orthopedic  Referral    3rd and 4th metatarsal, avulsion, concern for lis franc injury          Medical Decision Making:  Margarita See is a 64 year old female who presents for evaluation of right foot pain after mechanical trip and fall. CMS is intact distally in the extremity.  Xrays reveal fractures of the 3rd and 4th metatarsal and avulsion fracture at the TMT on my read; radiology notes as per below.  The patient/family understand that this may change with time and orthopedic Hand follow-up is indicated. Given concern for Lis Franc injury and fractures described below; close follow-up is indicated in the next days with ortho/podiatry.  Patient was placed in a CAM walker boot and given crutches; instructed to be non-weightbearing. No signs of compartment syndrome at this juncture. Patient verbalizes understanding and agreement with the plan including reasons to go to the ER. All questions answered.       Physician Assistant Attestation       I saw and evaluated (Margarita See) as part of a shared APRN/PA visit.       I personally reviewed the vital signs.       I personally performed the substantive portion of the medical decision making for this visit - please see the ESTRELLA's documentation for full details.         I personally performed the substantive portion of the physical exam - please see the ESTRELLA's documentation for full details.       (Provider name and title: (Edwar Corea PA-C)   Date of Service (when I saw the patient):  (1/15/2024)   Tonya Davison, JI student  Fanny Thomas, JI student    Chief Complaint   Patient presents with    Foot Injury     Patient seen today for right foot pain. She stakes that about two hours ago she tripped  over her grandchild toys. She would like to make sure everything is ok.         SUBJECTIVE:    HPI:    Margarita See is an 64 year old female with a past medical history of allergic rhinitis, recurrent sinusitis, and GISELA who presents for evaluation of right foot pain. Patient says she was running to the bathroom two hours ago when she hit her right foot on a cabinet door. Patient says she felt immediate pain to her foot and was unable to fully bare weight. She says she elevated her foot immediately and tried to wrap it with a compression garment. Patient says this caused increased pain. Patient developed bruising to distal foot/toes soon after. Patient is able to walk on the lateral edge of her foot. Patient rates her pain at rest a 0/10 in severity, and a 4/10 while walking. Patient denies numbness and tingling. Patient took ibuprofen at home.       ROS:     Review of Systems     See HPI        OBJECTIVE     Vital signs reviewed by Tonya Davison    Patient Vitals for the past 24 hrs:   BP Temp Temp src Pulse Resp SpO2 Weight   01/15/24 1435 128/60 98.6  F (37  C) Tympanic 97 20 97 % 77.8 kg (171 lb 9.6 oz)         Physical Exam   Physical Exam   Constitutional: The patient is oriented to person, place, and time. Alert and cooperative.   Right foot: Normal range of motion to right ankle. Able to flex and extend foot without significant pain. Slight tenderness to palpation over 1st metatarsal. Ecchymosis and swelling to distal toes to metatarsals. Capillary refill less than 3 seconds to distal phalanges.   Neurological: neurologically intact. Alert and oriented x3.  Skin: See right foot.     Labs/Imaging:  Results for orders placed or performed in visit on 01/15/24   XR Foot Right G/E 3 Views     Status: None    Narrative    FOOT RIGHT THREE OR MORE VIEWS 1/15/2024 3:03 PM     HISTORY: Injury of right foot, initial encounter.    COMPARISON: None.       Impression    IMPRESSION:  Multiple midfoot fractures including  nondisplaced  fractures of the proximal third and fourth metatarsals. Suspect  nondisplaced proximal second metatarsal fracture. There is a tiny  avulsion fracture along the medial cuneiform at the first TMT joint.  Bony irregularity within the Lisfranc interval does raise concern for  a fracture within this area. Recommend CT or MRI for further  evaluation. There is no lashon Lisfranc subluxation on this  nonweightbearing study.    NOTE: ABNORMAL REPORT    THE DICTATION ABOVE DESCRIBES AN ABNORMAL REPORT FOR WHICH FOLLOW-UP  IS NEEDED.     EITAN MACKEY MD         SYSTEM ID:  [id number]     Reading per radiology        Edwar Corea PA-C  [facility]

## 2024-01-15 NOTE — PROGRESS NOTES
"Assessment & Plan     Diagnosis:    (S92.901A) Closed fracture of right foot, initial encounter  (primary encounter diagnosis)  Comment: ***  Plan: XR Foot Right G/E 3 Views, Ankle/Foot Bracing         Supplies Order Walking Boot; Right; Pneumatic;         Short, Crutches Order for DME - ONLY FOR DME,         Orthopedic  Referral          Medical Decision Making:  Margarita See is a 64 year old female who presents for evaluation of ***.     Patient voices understanding and agreement with the plan including reasons to go to the ER immediately as well as to be seen by a more consistent care-giver, such as their PCP, if the symptoms persist more than *** days.       {2021 E&M time (Optional):738095}      Edwar Corea PA-C  [facility]    Subjective     Margarita See is a 64 year old female who presents to clinic today for the following health issues:  Chief Complaint   Patient presents with    Foot Injury     Patient seen today for right foot pain. She stakes that about two hours ago she tripped over her grandchild toys. She would like to make sure everything is ok.       HPI    {UC Conditions (Optional):880034}    Patient describes the symptoms as \"***\"     Patient denies any ***.     Review of Systems    See HPI    Objective      Vitals: /60 (BP Location: Left arm, Patient Position: Sitting, Cuff Size: Adult Regular)   Pulse 97   Temp 98.6  F (37  C) (Tympanic)   Resp 20   Wt 77.8 kg (171 lb 9.6 oz)   LMP 12/10/2010 (Exact Date)   SpO2 97%   BMI 32.23 kg/m    Resp: ***    Patient Vitals for the past 24 hrs:   BP Temp Temp src Pulse Resp SpO2 Weight   01/15/24 1435 128/60 98.6  F (37  C) Tympanic 97 20 97 % 77.8 kg (171 lb 9.6 oz)       Vital signs reviewed by: Edwar Corea PA-C    Physical Exam   Constitutional: Patient is alert and cooperative. No acute distress***.  Ears: Right TM is ***. Left TM is ***. External ear canals are normal.  Mouth: Mucous membranes are " moist. Normal tongue and tonsil. Posterior oropharynx is clear.  Eyes: Conjunctivae, EOMI and lids are normal. PERRL.  Cardiovascular: Regular rate and rhythm***  Pulmonary/Chest: Lungs are clear to auscultation throughout. Effort normal. No respiratory distress. No wheezes, rales or rhonchi.  GI: Abdomen is soft and non-tender throughout.*** No CVA tenderness bilaterally***.  Neurological: Alert and oriented x3. CN 3-7 and 9-12 intact. Strength and sensation are intact and symmetric in the bilateral upper and lower extremities.   Skin: No rash noted on visualized skin.  Psychiatric:The patient has a normal mood and affect.     Labs/Imaging:  No results found for any visits on 01/15/24.      Interventions:    ***    Edwar Corea PA-C, January 15, 2024

## 2024-01-19 ENCOUNTER — OFFICE VISIT (OUTPATIENT)
Dept: PODIATRY | Facility: CLINIC | Age: 64
End: 2024-01-19
Attending: EMERGENCY MEDICINE
Payer: COMMERCIAL

## 2024-01-19 DIAGNOSIS — S92.901A CLOSED FRACTURE OF RIGHT FOOT, INITIAL ENCOUNTER: ICD-10-CM

## 2024-01-19 PROCEDURE — 99204 OFFICE O/P NEW MOD 45 MIN: CPT | Performed by: PODIATRIST

## 2024-01-19 NOTE — PROGRESS NOTES
Past Medical History:   Diagnosis Date    Allergic rhinitis 11/23/2010    History of asthma     Menorrhagia      Patient Active Problem List   Diagnosis    CARDIOVASCULAR SCREENING; LDL GOAL LESS THAN 160    Allergic rhinitis    GISELA (generalized anxiety disorder)    Menometrorrhagia    Recurrent sinusitis     Past Surgical History:   Procedure Laterality Date    BIOPSY  10/2020    Fibroid removal at yearly at [facility]'s [place]    COLONOSCOPY WITH CO2 INSUFFLATION N/A 07/22/2016    Procedure: COLONOSCOPY WITH CO2 INSUFFLATION;  Surgeon: Duane, William Charles, MD;  Location:  OR    No surgical history       Social History     Socioeconomic History    Marital status:      Spouse name: Not on file    Number of children: Not on file    Years of education: Not on file    Highest education level: Not on file   Occupational History    Not on file   Tobacco Use    Smoking status: Never     Passive exposure: Never    Smokeless tobacco: Never   Vaping Use    Vaping Use: Never used   Substance and Sexual Activity    Alcohol use: Yes     Comment: Sometimes    Drug use: Never    Sexual activity: Not Currently     Partners: Male     Birth control/protection: Male Surgical     Comment: vas   Other Topics Concern    Parent/sibling w/ CABG, MI or angioplasty before 65F 55M? No   Social History Narrative    Not on file     Social Determinants of Health     Financial Resource Strain: Low Risk  (11/22/2023)    Financial Resource Strain     Within the past 12 months, have you or your family members you live with been unable to get utilities (heat, electricity) when it was really needed?: No   Food Insecurity: Low Risk  (11/22/2023)    Food Insecurity     Within the past 12 months, did you worry that your food would run out before you got money to buy more?: No     Within the past 12 months, did the food you bought just not last and you didn t have money to get more?: No   Transportation Needs: Low Risk  (11/22/2023)     Transportation Needs     Within the past 12 months, has lack of transportation kept you from medical appointments, getting your medicines, non-medical meetings or appointments, work, or from getting things that you need?: No   Physical Activity: Not on file   Stress: Not on file   Social Connections: Not on file   Interpersonal Safety: Low Risk  (11/22/2023)    Interpersonal Safety     Do you feel physically and emotionally safe where you currently live?: Yes     Within the past 12 months, have you been hit, slapped, kicked or otherwise physically hurt by someone?: No     Within the past 12 months, have you been humiliated or emotionally abused in other ways by your partner or ex-partner?: No   Housing Stability: Low Risk  (11/22/2023)    Housing Stability     Do you have housing? : Yes     Are you worried about losing your housing?: No     Family History   Problem Relation Age of Onset    Diabetes Mother     Coronary Artery Disease Mother 76    Emphysema Father     Coronary Artery Disease Father 76    Diabetes Maternal Grandmother     Breast Cancer Paternal Aunt            Subjective findings- 64-year-old presents for right foot injury from urgent care, I reviewed urgent cares 1/15/2024 note.  She relates that she tripped over a pillow and ran into a TV stand, not sure the mechanism of injury, relates the pain has been okay and she has not had to take any pain medications, presents in wheelchair, is using the cam boot and crutches.    Objective findings- DP and PT are 2 out of 4 right, CFT is less than 3 seconds.  Has edema of the right foot.  There is no erythema, no drainage, no odor, no calor.  X-rays 3 views right foot nonweightbearing from 1/15/2024 reviewed with patient and  in clinic today with fractures of the second third and fourth metatarsals and Lisfranc's joint, with minimal lateral displacement of the second tarsometatarsal joint noted.    Assessment and plan- Right foot injury with metatarsal  fractures and what appears to be Lisfranc's fracture/dislocation.  Diagnosis and treatment options discussed with them.  Continue nonweightbearing with rest and elevation, crutches, prescription for rollabout given use discussed with them.  Referral to orthopedic surgery given for evaluation and management of any surgical procedures and use discussed with them.  Return to clinic and see me as needed pending her orthopedic surgery referral appointment.            Moderate level of medical decision making.

## 2024-01-19 NOTE — LETTER
1/19/2024         RE: Margarita See  [address] [place] [address]        Dear Colleague,    Thank you for referring your patient, Margarita See, to the [facility]. Please see a copy of my visit note below.    Past Medical History:   Diagnosis Date     Allergic rhinitis 11/23/2010     History of asthma      Menorrhagia      Patient Active Problem List   Diagnosis     CARDIOVASCULAR SCREENING; LDL GOAL LESS THAN 160     Allergic rhinitis     GISELA (generalized anxiety disorder)     Menometrorrhagia     Recurrent sinusitis     Past Surgical History:   Procedure Laterality Date     BIOPSY  10/2020    Fibroid removal at yearly at [facility]'s [place]     COLONOSCOPY WITH CO2 INSUFFLATION N/A 07/22/2016    Procedure: COLONOSCOPY WITH CO2 INSUFFLATION;  Surgeon: Duane, William Charles, MD;  Location:  OR     No surgical history       Social History     Socioeconomic History     Marital status:      Spouse name: Not on file     Number of children: Not on file     Years of education: Not on file     Highest education level: Not on file   Occupational History     Not on file   Tobacco Use     Smoking status: Never     Passive exposure: Never     Smokeless tobacco: Never   Vaping Use     Vaping Use: Never used   Substance and Sexual Activity     Alcohol use: Yes     Comment: Sometimes     Drug use: Never     Sexual activity: Not Currently     Partners: Male     Birth control/protection: Male Surgical     Comment: vas   Other Topics Concern     Parent/sibling w/ CABG, MI or angioplasty before 65F 55M? No   Social History Narrative     Not on file     Social Determinants of Health     Financial Resource Strain: Low Risk  (11/22/2023)    Financial Resource Strain      Within the past 12 months, have you or your family members you live with been unable to get utilities (heat, electricity) when it was really needed?: No   Food Insecurity: Low Risk  (11/22/2023)    Food  Insecurity      Within the past 12 months, did you worry that your food would run out before you got money to buy more?: No      Within the past 12 months, did the food you bought just not last and you didn t have money to get more?: No   Transportation Needs: Low Risk  (11/22/2023)    Transportation Needs      Within the past 12 months, has lack of transportation kept you from medical appointments, getting your medicines, non-medical meetings or appointments, work, or from getting things that you need?: No   Physical Activity: Not on file   Stress: Not on file   Social Connections: Not on file   Interpersonal Safety: Low Risk  (11/22/2023)    Interpersonal Safety      Do you feel physically and emotionally safe where you currently live?: Yes      Within the past 12 months, have you been hit, slapped, kicked or otherwise physically hurt by someone?: No      Within the past 12 months, have you been humiliated or emotionally abused in other ways by your partner or ex-partner?: No   Housing Stability: Low Risk  (11/22/2023)    Housing Stability      Do you have housing? : Yes      Are you worried about losing your housing?: No     Family History   Problem Relation Age of Onset     Diabetes Mother      Coronary Artery Disease Mother 76     Emphysema Father      Coronary Artery Disease Father 76     Diabetes Maternal Grandmother      Breast Cancer Paternal Aunt            Subjective findings- 64-year-old presents for right foot injury from urgent care, I reviewed urgent cares 1/15/2024 note.  She relates that she tripped over a pillow and ran into a TV stand, not sure the mechanism of injury, relates the pain has been okay and she has not had to take any pain medications, presents in wheelchair, is using the cam boot and crutches.    Objective findings- DP and PT are 2 out of 4 right, CFT is less than 3 seconds.  Has edema of the right foot.  There is no erythema, no drainage, no odor, no calor.  X-rays 3 views right  foot nonweightbearing from 1/15/2024 reviewed with patient and  in clinic today with fractures of the second third and fourth metatarsals and Lisfranc's joint, with minimal lateral displacement of the second tarsometatarsal joint noted.    Assessment and plan- Right foot injury with metatarsal fractures and what appears to be Lisfranc's fracture/dislocation.  Diagnosis and treatment options discussed with them.  Continue nonweightbearing with rest and elevation, crutches, prescription for rollabout given use discussed with them.  Referral to orthopedic surgery given for evaluation and management of any surgical procedures and use discussed with them.  Return to clinic and see me as needed pending her orthopedic surgery referral appointment.            Moderate level of medical decision making.      Again, thank you for allowing me to participate in the care of your patient.        Sincerely,        Janes Love DPM

## 2024-01-19 NOTE — NURSING NOTE
Margarita See's chief complaint for this visit includes:  Chief Complaint   Patient presents with    RECHECK     Foot fracture      PCP: Berta Umanzor    Referring Provider:  Edwar Corea PA-C  [address] [place] [id number]    [facility] 12/10/2010 (Exact Date)   Data Unavailable        Allergies   Allergen Reactions    Other [No Clinical Screening - See Comments]      Something for a yeast infection    Spinach Nausea and Vomiting         Do you need any medication refills at today's visit?

## 2024-10-13 ENCOUNTER — HEALTH MAINTENANCE LETTER (OUTPATIENT)
Age: 64
End: 2024-10-13

## 2025-05-24 ENCOUNTER — HEALTH MAINTENANCE LETTER (OUTPATIENT)
Age: 65
End: 2025-05-24